# Patient Record
Sex: MALE | Race: WHITE | URBAN - METROPOLITAN AREA
[De-identification: names, ages, dates, MRNs, and addresses within clinical notes are randomized per-mention and may not be internally consistent; named-entity substitution may affect disease eponyms.]

---

## 2020-12-11 ENCOUNTER — OFFICE VISIT (OUTPATIENT)
Dept: FAMILY MEDICINE CLINIC | Facility: CLINIC | Age: 40
End: 2020-12-11
Payer: COMMERCIAL

## 2020-12-11 VITALS
WEIGHT: 277.25 LBS | HEIGHT: 68 IN | TEMPERATURE: 98 F | DIASTOLIC BLOOD PRESSURE: 82 MMHG | HEART RATE: 77 BPM | SYSTOLIC BLOOD PRESSURE: 128 MMHG | RESPIRATION RATE: 16 BRPM | BODY MASS INDEX: 42.02 KG/M2

## 2020-12-11 DIAGNOSIS — M10.9 GOUT OF BOTH KNEES: ICD-10-CM

## 2020-12-11 DIAGNOSIS — I10 ESSENTIAL HYPERTENSION: ICD-10-CM

## 2020-12-11 DIAGNOSIS — E66.01 CLASS 3 SEVERE OBESITY DUE TO EXCESS CALORIES WITHOUT SERIOUS COMORBIDITY WITH BODY MASS INDEX (BMI) OF 40.0 TO 44.9 IN ADULT (HCC): ICD-10-CM

## 2020-12-11 DIAGNOSIS — Z00.00 ROUTINE ADULT HEALTH MAINTENANCE: Primary | ICD-10-CM

## 2020-12-11 PROBLEM — E66.813 CLASS 3 SEVERE OBESITY DUE TO EXCESS CALORIES WITHOUT SERIOUS COMORBIDITY WITH BODY MASS INDEX (BMI) OF 40.0 TO 44.9 IN ADULT: Status: ACTIVE | Noted: 2020-12-11

## 2020-12-11 PROBLEM — E66.813 CLASS 3 SEVERE OBESITY DUE TO EXCESS CALORIES WITHOUT SERIOUS COMORBIDITY WITH BODY MASS INDEX (BMI) OF 40.0 TO 44.9 IN ADULT (HCC): Status: ACTIVE | Noted: 2020-12-11

## 2020-12-11 PROCEDURE — 3079F DIAST BP 80-89 MM HG: CPT | Performed by: FAMILY MEDICINE

## 2020-12-11 PROCEDURE — 99385 PREV VISIT NEW AGE 18-39: CPT | Performed by: FAMILY MEDICINE

## 2020-12-11 PROCEDURE — 3074F SYST BP LT 130 MM HG: CPT | Performed by: FAMILY MEDICINE

## 2020-12-11 PROCEDURE — 99202 OFFICE O/P NEW SF 15 MIN: CPT | Performed by: FAMILY MEDICINE

## 2020-12-11 PROCEDURE — 3008F BODY MASS INDEX DOCD: CPT | Performed by: FAMILY MEDICINE

## 2020-12-11 RX ORDER — ALLOPURINOL 300 MG/1
1 TABLET ORAL DAILY
COMMUNITY
Start: 2020-12-08 | End: 2022-01-31

## 2020-12-11 RX ORDER — AMLODIPINE BESYLATE 2.5 MG/1
1 TABLET ORAL DAILY
COMMUNITY
Start: 2020-12-08 | End: 2020-12-23

## 2020-12-11 RX ORDER — LISINOPRIL AND HYDROCHLOROTHIAZIDE 20; 12.5 MG/1; MG/1
1 TABLET ORAL DAILY
COMMUNITY
Start: 2015-02-01 | End: 2022-01-31

## 2020-12-11 RX ORDER — TRIAMTERENE AND HYDROCHLOROTHIAZIDE 37.5; 25 MG/1; MG/1
1 TABLET ORAL
COMMUNITY
Start: 2019-11-01 | End: 2020-12-23

## 2020-12-11 RX ORDER — ATORVASTATIN CALCIUM 20 MG/1
1 TABLET, FILM COATED ORAL DAILY
COMMUNITY
Start: 2020-12-08 | End: 2022-01-31

## 2020-12-11 NOTE — PROGRESS NOTES
Tawanda Eaton is a 45year old male who presents for a complete physical exam.   HPI:   Pt feels fine, no specific complaints today. Has h/o gout in both knees, controlled with allopurinol as preventive.      Allopurinol started at 100mg , the swelling  GI: denies abdominal pain,denies heartburn, denies n/v/c/d/change in stools/blood in stool/black stool/change in appetite  : denies nocturia or changes in urinary stream, denies scrotal mass/abnormal discharge from urethra  MUSCULOSKELETAL: den 09/28/2020      ASSESSMENT AND PLAN:   Tawanda Eaton is a 45year old male who presents for a complete physical exam.     Wai Boykin was seen today for physical and htn.     Diagnoses and all orders for this visit:    Routine adult health mainten

## 2020-12-21 ENCOUNTER — LAB ENCOUNTER (OUTPATIENT)
Dept: LAB | Age: 40
End: 2020-12-21
Attending: FAMILY MEDICINE
Payer: COMMERCIAL

## 2020-12-21 DIAGNOSIS — M10.9 GOUT OF BOTH KNEES: ICD-10-CM

## 2020-12-21 DIAGNOSIS — Z00.00 ROUTINE ADULT HEALTH MAINTENANCE: ICD-10-CM

## 2020-12-21 PROCEDURE — 84443 ASSAY THYROID STIM HORMONE: CPT

## 2020-12-21 PROCEDURE — 80061 LIPID PANEL: CPT

## 2020-12-21 PROCEDURE — 36415 COLL VENOUS BLD VENIPUNCTURE: CPT

## 2020-12-21 PROCEDURE — 85027 COMPLETE CBC AUTOMATED: CPT

## 2020-12-21 PROCEDURE — 84439 ASSAY OF FREE THYROXINE: CPT

## 2020-12-21 PROCEDURE — 84550 ASSAY OF BLOOD/URIC ACID: CPT

## 2020-12-21 PROCEDURE — 80053 COMPREHEN METABOLIC PANEL: CPT

## 2020-12-23 ENCOUNTER — OFFICE VISIT (OUTPATIENT)
Dept: FAMILY MEDICINE CLINIC | Facility: CLINIC | Age: 40
End: 2020-12-23
Payer: COMMERCIAL

## 2020-12-23 VITALS
SYSTOLIC BLOOD PRESSURE: 118 MMHG | RESPIRATION RATE: 16 BRPM | TEMPERATURE: 98 F | HEART RATE: 72 BPM | DIASTOLIC BLOOD PRESSURE: 68 MMHG | WEIGHT: 277.5 LBS | BODY MASS INDEX: 42.06 KG/M2 | HEIGHT: 68 IN

## 2020-12-23 DIAGNOSIS — R74.8 ELEVATED LIVER ENZYMES: ICD-10-CM

## 2020-12-23 DIAGNOSIS — E66.01 CLASS 3 SEVERE OBESITY DUE TO EXCESS CALORIES WITHOUT SERIOUS COMORBIDITY WITH BODY MASS INDEX (BMI) OF 40.0 TO 44.9 IN ADULT (HCC): ICD-10-CM

## 2020-12-23 DIAGNOSIS — E78.00 HIGH CHOLESTEROL: Primary | ICD-10-CM

## 2020-12-23 PROCEDURE — 3008F BODY MASS INDEX DOCD: CPT | Performed by: FAMILY MEDICINE

## 2020-12-23 PROCEDURE — 3078F DIAST BP <80 MM HG: CPT | Performed by: FAMILY MEDICINE

## 2020-12-23 PROCEDURE — 3074F SYST BP LT 130 MM HG: CPT | Performed by: FAMILY MEDICINE

## 2020-12-23 PROCEDURE — 99214 OFFICE O/P EST MOD 30 MIN: CPT | Performed by: FAMILY MEDICINE

## 2020-12-24 PROBLEM — R74.8 ELEVATED LIVER ENZYMES: Status: ACTIVE | Noted: 2020-12-24

## 2020-12-24 PROBLEM — E78.00 HIGH CHOLESTEROL: Status: ACTIVE | Noted: 2020-12-24

## 2020-12-24 NOTE — PROGRESS NOTES
HPI:   Rosa Casillas is a 45year old male that presents for follow-up of lab work from physical.    Past medical, surgical, family and social history reviewed in detail with patient. Updated where appropriate.       Patient Active Problem List rales/rhonchi/wheezing. ABDOMEN:  Soft, nondistended, nontender, bowel sounds normal in all 4 quadrants, no masses, no hepatosplenomegaly. EXTREMITIES:  No edema, no cyanosis, 2+ radial pulses b/l.    NEURO:  Grossly normal     ASSESSMENT AND PLAN:      1

## 2022-01-31 ENCOUNTER — OFFICE VISIT (OUTPATIENT)
Dept: FAMILY MEDICINE CLINIC | Facility: CLINIC | Age: 42
End: 2022-01-31
Payer: COMMERCIAL

## 2022-01-31 ENCOUNTER — LAB ENCOUNTER (OUTPATIENT)
Dept: LAB | Age: 42
End: 2022-01-31
Attending: FAMILY MEDICINE
Payer: COMMERCIAL

## 2022-01-31 VITALS
WEIGHT: 276.5 LBS | BODY MASS INDEX: 41.91 KG/M2 | OXYGEN SATURATION: 98 % | RESPIRATION RATE: 16 BRPM | SYSTOLIC BLOOD PRESSURE: 120 MMHG | HEART RATE: 75 BPM | DIASTOLIC BLOOD PRESSURE: 88 MMHG | TEMPERATURE: 97 F | HEIGHT: 68 IN

## 2022-01-31 DIAGNOSIS — Z00.00 ROUTINE ADULT HEALTH MAINTENANCE: Primary | ICD-10-CM

## 2022-01-31 DIAGNOSIS — E66.01 CLASS 3 SEVERE OBESITY DUE TO EXCESS CALORIES WITHOUT SERIOUS COMORBIDITY WITH BODY MASS INDEX (BMI) OF 40.0 TO 44.9 IN ADULT (HCC): ICD-10-CM

## 2022-01-31 DIAGNOSIS — M10.9 GOUT OF BOTH KNEES: ICD-10-CM

## 2022-01-31 DIAGNOSIS — Z00.00 ROUTINE ADULT HEALTH MAINTENANCE: ICD-10-CM

## 2022-01-31 DIAGNOSIS — E78.00 HIGH CHOLESTEROL: ICD-10-CM

## 2022-01-31 LAB
ALBUMIN SERPL-MCNC: 4.2 G/DL (ref 3.4–5)
ALBUMIN/GLOB SERPL: 1.5 {RATIO} (ref 1–2)
ALP LIVER SERPL-CCNC: 85 U/L
ALT SERPL-CCNC: 116 U/L
ANION GAP SERPL CALC-SCNC: 6 MMOL/L (ref 0–18)
AST SERPL-CCNC: 54 U/L (ref 15–37)
BASOPHILS # BLD AUTO: 0.06 X10(3) UL (ref 0–0.2)
BASOPHILS NFR BLD AUTO: 0.9 %
BILIRUB SERPL-MCNC: 0.6 MG/DL (ref 0.1–2)
BUN BLD-MCNC: 13 MG/DL (ref 7–18)
CALCIUM BLD-MCNC: 9.5 MG/DL (ref 8.5–10.1)
CHLORIDE SERPL-SCNC: 107 MMOL/L (ref 98–112)
CHOLEST SERPL-MCNC: 174 MG/DL (ref ?–200)
CO2 SERPL-SCNC: 28 MMOL/L (ref 21–32)
CREAT BLD-MCNC: 0.97 MG/DL
EOSINOPHIL # BLD AUTO: 0.12 X10(3) UL (ref 0–0.7)
EOSINOPHIL NFR BLD AUTO: 1.8 %
ERYTHROCYTE [DISTWIDTH] IN BLOOD BY AUTOMATED COUNT: 13.4 %
FASTING PATIENT LIPID ANSWER: YES
FASTING STATUS PATIENT QL REPORTED: YES
GLOBULIN PLAS-MCNC: 2.8 G/DL (ref 2.8–4.4)
GLUCOSE BLD-MCNC: 85 MG/DL (ref 70–99)
HDLC SERPL-MCNC: 36 MG/DL (ref 40–59)
HGB BLD-MCNC: 15.6 G/DL
IMM GRANULOCYTES # BLD AUTO: 0.01 X10(3) UL (ref 0–1)
IMM GRANULOCYTES NFR BLD: 0.1 %
LDLC SERPL CALC-MCNC: 108 MG/DL (ref ?–100)
LYMPHOCYTES # BLD AUTO: 3.54 X10(3) UL (ref 1–4)
LYMPHOCYTES NFR BLD AUTO: 51.9 %
MCH RBC QN AUTO: 30.4 PG (ref 26–34)
MCHC RBC AUTO-ENTMCNC: 33.1 G/DL (ref 31–37)
MCV RBC AUTO: 91.6 FL
MONOCYTES # BLD AUTO: 0.54 X10(3) UL (ref 0.1–1)
MONOCYTES NFR BLD AUTO: 7.9 %
NEUTROPHILS # BLD AUTO: 2.55 X10 (3) UL (ref 1.5–7.7)
NEUTROPHILS # BLD AUTO: 2.55 X10(3) UL (ref 1.5–7.7)
NEUTROPHILS NFR BLD AUTO: 37.4 %
NONHDLC SERPL-MCNC: 138 MG/DL (ref ?–130)
OSMOLALITY SERPL CALC.SUM OF ELEC: 291 MOSM/KG (ref 275–295)
PLATELET # BLD AUTO: 235 10(3)UL (ref 150–450)
POTASSIUM SERPL-SCNC: 4 MMOL/L (ref 3.5–5.1)
PROT SERPL-MCNC: 7 G/DL (ref 6.4–8.2)
RBC # BLD AUTO: 5.14 X10(6)UL
SODIUM SERPL-SCNC: 141 MMOL/L (ref 136–145)
T4 FREE SERPL-MCNC: 1 NG/DL (ref 0.8–1.7)
TRIGL SERPL-MCNC: 173 MG/DL (ref 30–149)
TSI SER-ACNC: 1.58 MIU/ML (ref 0.36–3.74)
WBC # BLD AUTO: 6.8 X10(3) UL (ref 4–11)

## 2022-01-31 PROCEDURE — 3074F SYST BP LT 130 MM HG: CPT | Performed by: FAMILY MEDICINE

## 2022-01-31 PROCEDURE — 36415 COLL VENOUS BLD VENIPUNCTURE: CPT

## 2022-01-31 PROCEDURE — 84550 ASSAY OF BLOOD/URIC ACID: CPT

## 2022-01-31 PROCEDURE — 80061 LIPID PANEL: CPT

## 2022-01-31 PROCEDURE — 3008F BODY MASS INDEX DOCD: CPT | Performed by: FAMILY MEDICINE

## 2022-01-31 PROCEDURE — 84443 ASSAY THYROID STIM HORMONE: CPT

## 2022-01-31 PROCEDURE — 99396 PREV VISIT EST AGE 40-64: CPT | Performed by: FAMILY MEDICINE

## 2022-01-31 PROCEDURE — 80053 COMPREHEN METABOLIC PANEL: CPT

## 2022-01-31 PROCEDURE — 85025 COMPLETE CBC W/AUTO DIFF WBC: CPT

## 2022-01-31 PROCEDURE — 84439 ASSAY OF FREE THYROXINE: CPT

## 2022-01-31 PROCEDURE — 3079F DIAST BP 80-89 MM HG: CPT | Performed by: FAMILY MEDICINE

## 2022-01-31 RX ORDER — ATORVASTATIN CALCIUM 20 MG/1
20 TABLET, FILM COATED ORAL DAILY
Qty: 90 TABLET | Refills: 1 | Status: SHIPPED | OUTPATIENT
Start: 2022-01-31

## 2022-01-31 RX ORDER — LISINOPRIL AND HYDROCHLOROTHIAZIDE 20; 12.5 MG/1; MG/1
1 TABLET ORAL DAILY
Qty: 90 TABLET | Refills: 1 | Status: SHIPPED | OUTPATIENT
Start: 2022-01-31

## 2022-01-31 RX ORDER — ALLOPURINOL 300 MG/1
300 TABLET ORAL DAILY
Qty: 90 TABLET | Refills: 1 | Status: SHIPPED | OUTPATIENT
Start: 2022-01-31

## 2022-01-31 NOTE — PROGRESS NOTES
Anali Patel is a 37year old male who presents for a complete physical exam.   HPI:   Pt complains of nothing.   Urination changes no  ED symptoms no  Immunizations needed no  Wt Readings from Last 6 Encounters:  01/31/22 : 276 lb 8 oz (125.4 kg - 15.0 %    RDW-SD 45.6 35.1 - 46.3 fL       Current Outpatient Medications   Medication Sig Dispense Refill   • atorvastatin 20 MG Oral Tab Take 1 tablet (20 mg total) by mouth daily.  90 tablet 1   • lisinopril-hydroCHLOROthiazide 20-12.5 MG Oral Tab Take kg)   SpO2 98%   BMI 42.04 kg/m²   Body mass index is 42.04 kg/m².    GENERAL: NAD, pleasant, well developed, normal voice  SKIN: no rashes, no suspicious moles or lesions  HENT: NCAT, EACs clear b/l, TMs normal b/l, nasal turbinates normal b/l, oropharynx Future    Other orders  -     atorvastatin 20 MG Oral Tab; Take 1 tablet (20 mg total) by mouth daily. -     lisinopril-hydroCHLOROthiazide 20-12.5 MG Oral Tab; Take 1 tablet by mouth daily. -     allopurinol 300 MG Oral Tab;  Take 1 tablet (300 mg total)

## 2022-06-28 RX ORDER — ALLOPURINOL 300 MG/1
TABLET ORAL
Qty: 90 TABLET | Refills: 0 | OUTPATIENT
Start: 2022-06-28

## 2022-06-28 RX ORDER — ATORVASTATIN CALCIUM 20 MG/1
TABLET, FILM COATED ORAL
Qty: 90 TABLET | Refills: 0 | OUTPATIENT
Start: 2022-06-28

## 2022-06-28 RX ORDER — LISINOPRIL AND HYDROCHLOROTHIAZIDE 20; 12.5 MG/1; MG/1
TABLET ORAL
Qty: 90 TABLET | Refills: 0 | OUTPATIENT
Start: 2022-06-28

## 2022-06-28 NOTE — TELEPHONE ENCOUNTER
Future Appointments   Date Time Provider Deepthi Aguila   7/25/2022  9:40 AM Fabiola Malone MD [facility] [address] [facility] [address]     Pt has enough medication to last until his appt.

## 2022-07-25 ENCOUNTER — OFFICE VISIT (OUTPATIENT)
Dept: FAMILY MEDICINE CLINIC | Facility: CLINIC | Age: 42
End: 2022-07-25
Payer: COMMERCIAL

## 2022-07-25 VITALS
WEIGHT: 283.25 LBS | SYSTOLIC BLOOD PRESSURE: 112 MMHG | RESPIRATION RATE: 16 BRPM | TEMPERATURE: 97 F | OXYGEN SATURATION: 98 % | HEART RATE: 77 BPM | DIASTOLIC BLOOD PRESSURE: 80 MMHG | HEIGHT: 68 IN | BODY MASS INDEX: 42.93 KG/M2

## 2022-07-25 DIAGNOSIS — M10.9 GOUT OF BOTH KNEES: ICD-10-CM

## 2022-07-25 DIAGNOSIS — R74.8 ELEVATED LIVER ENZYMES: ICD-10-CM

## 2022-07-25 DIAGNOSIS — I10 ESSENTIAL HYPERTENSION: Primary | ICD-10-CM

## 2022-07-25 DIAGNOSIS — G47.33 OSA (OBSTRUCTIVE SLEEP APNEA): ICD-10-CM

## 2022-07-25 DIAGNOSIS — E66.01 CLASS 3 SEVERE OBESITY DUE TO EXCESS CALORIES WITHOUT SERIOUS COMORBIDITY WITH BODY MASS INDEX (BMI) OF 40.0 TO 44.9 IN ADULT (HCC): ICD-10-CM

## 2022-07-25 DIAGNOSIS — E78.00 HIGH CHOLESTEROL: ICD-10-CM

## 2022-07-25 PROCEDURE — 3074F SYST BP LT 130 MM HG: CPT | Performed by: FAMILY MEDICINE

## 2022-07-25 PROCEDURE — 3008F BODY MASS INDEX DOCD: CPT | Performed by: FAMILY MEDICINE

## 2022-07-25 PROCEDURE — 3079F DIAST BP 80-89 MM HG: CPT | Performed by: FAMILY MEDICINE

## 2022-07-25 PROCEDURE — 99215 OFFICE O/P EST HI 40 MIN: CPT | Performed by: FAMILY MEDICINE

## 2022-07-25 RX ORDER — LISINOPRIL AND HYDROCHLOROTHIAZIDE 20; 12.5 MG/1; MG/1
1 TABLET ORAL DAILY
Qty: 90 TABLET | Refills: 1 | Status: SHIPPED | OUTPATIENT
Start: 2022-07-25

## 2022-07-25 RX ORDER — ALLOPURINOL 300 MG/1
300 TABLET ORAL DAILY
Qty: 90 TABLET | Refills: 1 | Status: SHIPPED | OUTPATIENT
Start: 2022-07-25

## 2022-07-25 RX ORDER — ATORVASTATIN CALCIUM 20 MG/1
20 TABLET, FILM COATED ORAL DAILY
Qty: 90 TABLET | Refills: 1 | Status: SHIPPED | OUTPATIENT
Start: 2022-07-25

## 2022-08-01 ENCOUNTER — LAB ENCOUNTER (OUTPATIENT)
Dept: LAB | Age: 42
End: 2022-08-01
Attending: FAMILY MEDICINE
Payer: COMMERCIAL

## 2022-08-01 DIAGNOSIS — E78.00 HIGH CHOLESTEROL: ICD-10-CM

## 2022-08-01 DIAGNOSIS — R74.8 ELEVATED LIVER ENZYMES: ICD-10-CM

## 2022-08-01 DIAGNOSIS — M10.9 GOUT OF BOTH KNEES: ICD-10-CM

## 2022-08-01 LAB
ALBUMIN SERPL-MCNC: 4.1 G/DL (ref 3.4–5)
ALBUMIN/GLOB SERPL: 1.2 {RATIO} (ref 1–2)
ALP LIVER SERPL-CCNC: 81 U/L
ALT SERPL-CCNC: 117 U/L
ANION GAP SERPL CALC-SCNC: 4 MMOL/L (ref 0–18)
AST SERPL-CCNC: 60 U/L (ref 15–37)
BILIRUB SERPL-MCNC: 0.5 MG/DL (ref 0.1–2)
BUN BLD-MCNC: 13 MG/DL (ref 7–18)
CALCIUM BLD-MCNC: 9.7 MG/DL (ref 8.5–10.1)
CHLORIDE SERPL-SCNC: 105 MMOL/L (ref 98–112)
CHOLEST SERPL-MCNC: 171 MG/DL (ref ?–200)
CO2 SERPL-SCNC: 28 MMOL/L (ref 21–32)
CREAT BLD-MCNC: 1.1 MG/DL
FASTING PATIENT LIPID ANSWER: YES
FASTING STATUS PATIENT QL REPORTED: YES
GLOBULIN PLAS-MCNC: 3.5 G/DL (ref 2.8–4.4)
GLUCOSE BLD-MCNC: 135 MG/DL (ref 70–99)
HDLC SERPL-MCNC: 36 MG/DL (ref 40–59)
LDLC SERPL CALC-MCNC: 98 MG/DL (ref ?–100)
NONHDLC SERPL-MCNC: 135 MG/DL (ref ?–130)
OSMOLALITY SERPL CALC.SUM OF ELEC: 286 MOSM/KG (ref 275–295)
POTASSIUM SERPL-SCNC: 3.9 MMOL/L (ref 3.5–5.1)
PROT SERPL-MCNC: 7.6 G/DL (ref 6.4–8.2)
SODIUM SERPL-SCNC: 137 MMOL/L (ref 136–145)
TRIGL SERPL-MCNC: 216 MG/DL (ref 30–149)
URATE SERPL-MCNC: 4.2 MG/DL
VLDLC SERPL CALC-MCNC: 36 MG/DL (ref 0–30)

## 2022-08-01 PROCEDURE — 36415 COLL VENOUS BLD VENIPUNCTURE: CPT

## 2022-08-01 PROCEDURE — 80061 LIPID PANEL: CPT

## 2022-08-01 PROCEDURE — 80053 COMPREHEN METABOLIC PANEL: CPT

## 2022-08-01 PROCEDURE — 84550 ASSAY OF BLOOD/URIC ACID: CPT

## 2022-08-02 DIAGNOSIS — R74.8 ELEVATED LIVER ENZYMES: Primary | ICD-10-CM

## 2022-08-03 DIAGNOSIS — R74.8 ELEVATED LIVER ENZYMES: Primary | ICD-10-CM

## 2022-09-10 ENCOUNTER — HOSPITAL ENCOUNTER (OUTPATIENT)
Dept: ULTRASOUND IMAGING | Age: 42
Discharge: HOME | End: 2022-09-10
Attending: FAMILY MEDICINE
Payer: COMMERCIAL

## 2022-09-10 DIAGNOSIS — R74.8 ELEVATED LIVER ENZYMES: ICD-10-CM

## 2022-09-10 PROCEDURE — 76700 US EXAM ABDOM COMPLETE: CPT | Performed by: FAMILY MEDICINE

## 2022-09-21 DIAGNOSIS — R16.1 SPLENOMEGALY: Primary | ICD-10-CM

## 2022-10-10 ENCOUNTER — OFFICE VISIT (OUTPATIENT)
Dept: INTERNAL MEDICINE CLINIC | Facility: CLINIC | Age: 42
End: 2022-10-10
Payer: COMMERCIAL

## 2022-10-10 VITALS
BODY MASS INDEX: 44.05 KG/M2 | WEIGHT: 284 LBS | DIASTOLIC BLOOD PRESSURE: 88 MMHG | RESPIRATION RATE: 16 BRPM | SYSTOLIC BLOOD PRESSURE: 154 MMHG | HEART RATE: 90 BPM | HEIGHT: 67.5 IN

## 2022-10-10 DIAGNOSIS — I10 ESSENTIAL HYPERTENSION: ICD-10-CM

## 2022-10-10 DIAGNOSIS — Z83.3 FAMILY HISTORY OF DIABETES MELLITUS IN GRANDMOTHER: ICD-10-CM

## 2022-10-10 DIAGNOSIS — R74.8 ELEVATED LIVER ENZYMES: ICD-10-CM

## 2022-10-10 DIAGNOSIS — E66.01 SEVERE OBESITY (BMI >= 40) (HCC): ICD-10-CM

## 2022-10-10 DIAGNOSIS — Z51.81 THERAPEUTIC DRUG MONITORING: Primary | ICD-10-CM

## 2022-10-10 DIAGNOSIS — E78.00 HIGH CHOLESTEROL: ICD-10-CM

## 2022-10-10 PROCEDURE — 3008F BODY MASS INDEX DOCD: CPT | Performed by: NURSE PRACTITIONER

## 2022-10-10 PROCEDURE — 3079F DIAST BP 80-89 MM HG: CPT | Performed by: NURSE PRACTITIONER

## 2022-10-10 PROCEDURE — 99204 OFFICE O/P NEW MOD 45 MIN: CPT | Performed by: NURSE PRACTITIONER

## 2022-10-10 PROCEDURE — 3077F SYST BP >= 140 MM HG: CPT | Performed by: NURSE PRACTITIONER

## 2022-10-10 NOTE — PATIENT INSTRUCTIONS
We are here to support you with weight loss, but please remember that you still need your primary care provider for your routine health maintenance. PLAN:  Get blood work done  [employer] out some pre-made meal options: sandy flynn MD, metabolic meals, Factor 75, Freshly  Check with insurance on coverage for GLP-1 medications:  (ie. saxenda- daily, wegovy- weekly) or for the diagnosis of prediabetes or diabetes- (ie. Ozempic- weekly, trulicity- weekly, rybelsus- oral/ daily)    Follow up with me in 5 weeks  Schedule follow up appointments: Shelby Alejandre (dietitian) or Daryl Kay (presurgery dietitian)   Check for insurance coverage for dietitian and labwork prior to scheduling appointment. Please try to work on the following dietary changes:  1. Goals: Aim for 20-30 grams of protein/ meal  i. Aim for 170 grams of carbohydrates/day  ii. Eat 4-6 vegetables/day  iii. Avoid skipping meals- eat every 4-5 hours  iv. Aim for 3 meals/day  2. Drink lots of water and cut down on soda/juice consumption if soda/juice drinker  3. Focus on protein: (15-30 grams with each meal) ie. greek yogurt, cottage cheese, string cheese, hard boiled eggs  4. Healthy snacks: peanut butter and apples, hummus and carrots, berries, nuts (1/4 cup), tuna and crackers                 Protein Shakes: Premier protein or Core Power                Protein Bars: Rx Bars, Oatmega, Power Crunch                 Sargento balanced breaks (cheese and nuts)- without chocolate  5. Reduce carbohydrates which includes sweets as well as rice, pasta, potatoes, bread, corn and instead choose whole grain options or more protein or vegetables (4-6 servings of vegetables per day)  6. Get a good night of sleep  7. Try to decrease stress in life     Please download apps:  1.  \"My Fitness Pal\" (other option is Lose it)) to help you to monitor daily dietary intake and you will be able to see if you are eating the right amount of calories, protein, carbs                With My Fitness Pal-->When you set-up the luna or need to adjust settings:                Goals should include: Lose 1.5-2 lbs per week                Activity level: not very active (can't count exercise towards calorie number per day)                   ** Daily INPUT> Look at nutrition section-- \"nutrients\" and it will break down your macros for the day (ie. Protein, carbs, fibers, sugars and fats). Try to stay within these numbers daily     2. \"7 minute workout\" to help with exercise/activity which takes 7 minutes of your day and that you can do at home! 3. \"Calm\" or \"Headspace\" which helps with mindfulness, meditation, clarity, sleep, and arsh to your daily life. 4. [employer] blog for healthy recipe ideas  5. [employer] for low carb resources    HIGH PROTEIN SNACK IDEAS  -cottage cheese  -plain yogurt  -kefir  -hard-boiled eggs  -natural cheeses  -nuts (measure portion size)   -unsweetened nut butters  -dried edamame   -marisol seeds soaked in water or almond milk  -soy nuts  -cured meats (monitor for sodium issues)   -hummus with vegetables  -bean dip with vegetables     FRUIT  Low carb fruit options   Raspberries: Half a cup (60 grams) contains 3 grams of carbs. Blackberries: Half a cup (70 grams) contains 4 grams of carbs. Strawberries: Half a cup (100 grams) contains 6 grams of carbs. Blueberries: Half a cup (50 grams) contains 6 grams of carbs. Plum: One medium-sized (80 grams) contains 6 grams of carbs.      VEGETABLES  Low carb vegetables

## 2022-10-24 ENCOUNTER — LAB ENCOUNTER (OUTPATIENT)
Dept: LAB | Age: 42
End: 2022-10-24
Attending: NURSE PRACTITIONER
Payer: COMMERCIAL

## 2022-10-24 DIAGNOSIS — R79.89 ELEVATED LFTS: ICD-10-CM

## 2022-10-24 DIAGNOSIS — Z83.3 FAMILY HISTORY OF DIABETES MELLITUS IN GRANDMOTHER: ICD-10-CM

## 2022-10-24 DIAGNOSIS — E66.01 SEVERE OBESITY (BMI >= 40) (HCC): ICD-10-CM

## 2022-10-24 DIAGNOSIS — Z51.81 THERAPEUTIC DRUG MONITORING: ICD-10-CM

## 2022-10-24 LAB
CERULOPLASMIN SERPL-MCNC: 27.3 MG/DL (ref 20–60)
DEPRECATED HBV CORE AB SER IA-ACNC: 319.9 NG/ML
EST. AVERAGE GLUCOSE BLD GHB EST-MCNC: 137 MG/DL (ref 68–126)
HAV AB SER QL IA: NONREACTIVE
HBA1C MFR BLD: 6.4 % (ref ?–5.7)
HBV SURFACE AB SER QL: REACTIVE
HBV SURFACE AB SERPL IA-ACNC: 237.14 MIU/ML
HBV SURFACE AG SER-ACNC: <0.1 [IU]/L
HBV SURFACE AG SERPL QL IA: NONREACTIVE
HCV AB SERPL QL IA: NONREACTIVE
IGA SERPL-MCNC: 242 MG/DL (ref 70–312)
IGM SERPL-MCNC: 88.5 MG/DL (ref 43–279)
IMMUNOGLOBULIN PNL SER-MCNC: 540 MG/DL (ref 791–1643)
IRON SATN MFR SERPL: 22 %
IRON SERPL-MCNC: 75 UG/DL
TIBC SERPL-MCNC: 346 UG/DL (ref 240–450)
TRANSFERRIN SERPL-MCNC: 232 MG/DL (ref 200–360)
VIT B12 SERPL-MCNC: 402 PG/ML (ref 193–986)
VIT D+METAB SERPL-MCNC: 20.2 NG/ML (ref 30–100)

## 2022-10-24 PROCEDURE — 82390 ASSAY OF CERULOPLASMIN: CPT

## 2022-10-24 PROCEDURE — 82306 VITAMIN D 25 HYDROXY: CPT

## 2022-10-24 PROCEDURE — 86706 HEP B SURFACE ANTIBODY: CPT

## 2022-10-24 PROCEDURE — 82104 ALPHA-1-ANTITRYPSIN PHENO: CPT

## 2022-10-24 PROCEDURE — 83516 IMMUNOASSAY NONANTIBODY: CPT

## 2022-10-24 PROCEDURE — 87340 HEPATITIS B SURFACE AG IA: CPT

## 2022-10-24 PROCEDURE — 86708 HEPATITIS A ANTIBODY: CPT

## 2022-10-24 PROCEDURE — 82784 ASSAY IGA/IGD/IGG/IGM EACH: CPT

## 2022-10-24 PROCEDURE — 86364 TISS TRNSGLTMNASE EA IG CLAS: CPT

## 2022-10-24 PROCEDURE — 82728 ASSAY OF FERRITIN: CPT

## 2022-10-24 PROCEDURE — 83036 HEMOGLOBIN GLYCOSYLATED A1C: CPT

## 2022-10-24 PROCEDURE — 82103 ALPHA-1-ANTITRYPSIN TOTAL: CPT

## 2022-10-24 PROCEDURE — 83540 ASSAY OF IRON: CPT

## 2022-10-24 PROCEDURE — 83550 IRON BINDING TEST: CPT

## 2022-10-24 PROCEDURE — 86803 HEPATITIS C AB TEST: CPT

## 2022-10-24 PROCEDURE — 82607 VITAMIN B-12: CPT

## 2022-10-24 PROCEDURE — 36415 COLL VENOUS BLD VENIPUNCTURE: CPT

## 2022-10-25 ENCOUNTER — PATIENT MESSAGE (OUTPATIENT)
Dept: INTERNAL MEDICINE CLINIC | Facility: CLINIC | Age: 42
End: 2022-10-25

## 2022-10-25 DIAGNOSIS — E55.9 VITAMIN D DEFICIENCY: Primary | ICD-10-CM

## 2022-10-25 LAB — TTG IGA SER-ACNC: 0.5 U/ML (ref ?–7)

## 2022-10-25 RX ORDER — ERGOCALCIFEROL 1.25 MG/1
50000 CAPSULE ORAL WEEKLY
Qty: 16 CAPSULE | Refills: 0 | Status: SHIPPED | OUTPATIENT
Start: 2022-10-25

## 2022-10-25 NOTE — PROGRESS NOTES
Elevated blood sugar (a1c) it is in the prediabetes/ diabetic range: 6.4%- I recommend avoiding carbs, exercise, and possibly starting a medication, which will help with weight loss, prediabetes/diabetes   Low Vitamin d: please start taking ergocalciferol 50,000 U q week x16 weeks (please order).  Then start daily maintenance vitamin D 2000 Units  Mildly low Vitamin B12: please start daily over the counter B complex vitamin

## 2022-10-25 NOTE — TELEPHONE ENCOUNTER
From: Saeid Clarke  To: ROCK Ricardo  Sent: 10/25/2022 1:42 PM CDT  Subject: Question regarding VITAMIN B12    Hello, thanks for the results readout. I will get the vitamin D today. Is the B12 dosage OTC or prescribed? Should we plan on the weight loss med discussion at my follow up in late Nov, or start sooner?

## 2022-10-26 LAB
F-ACTIN (SMOOTH MUSCLE) AB: 2 UNITS
MITOCHONDRIAL M2 AB, IGG: 8.6 UNITS

## 2022-10-26 RX ORDER — SEMAGLUTIDE 1.34 MG/ML
0.25 INJECTION, SOLUTION SUBCUTANEOUS WEEKLY
Qty: 1.5 ML | Refills: 1 | Status: SHIPPED | OUTPATIENT
Start: 2022-10-26

## 2022-10-28 LAB — ALPHA-1-ANTITRYPSIN: 131 MG/DL

## 2022-11-28 ENCOUNTER — OFFICE VISIT (OUTPATIENT)
Dept: INTERNAL MEDICINE CLINIC | Facility: CLINIC | Age: 42
End: 2022-11-28
Payer: COMMERCIAL

## 2022-11-28 VITALS
WEIGHT: 268 LBS | HEIGHT: 67.5 IN | DIASTOLIC BLOOD PRESSURE: 80 MMHG | SYSTOLIC BLOOD PRESSURE: 122 MMHG | HEART RATE: 78 BPM | RESPIRATION RATE: 16 BRPM | BODY MASS INDEX: 41.57 KG/M2

## 2022-11-28 DIAGNOSIS — E66.01 SEVERE OBESITY (BMI >= 40) (HCC): ICD-10-CM

## 2022-11-28 DIAGNOSIS — I10 ESSENTIAL HYPERTENSION: ICD-10-CM

## 2022-11-28 DIAGNOSIS — R74.8 ELEVATED LIVER ENZYMES: ICD-10-CM

## 2022-11-28 DIAGNOSIS — R73.03 PREDIABETES: ICD-10-CM

## 2022-11-28 DIAGNOSIS — E78.00 HIGH CHOLESTEROL: ICD-10-CM

## 2022-11-28 DIAGNOSIS — Z51.81 THERAPEUTIC DRUG MONITORING: Primary | ICD-10-CM

## 2022-11-28 PROCEDURE — 99213 OFFICE O/P EST LOW 20 MIN: CPT | Performed by: NURSE PRACTITIONER

## 2022-11-28 PROCEDURE — 3008F BODY MASS INDEX DOCD: CPT | Performed by: NURSE PRACTITIONER

## 2022-11-28 PROCEDURE — 3079F DIAST BP 80-89 MM HG: CPT | Performed by: NURSE PRACTITIONER

## 2022-11-28 PROCEDURE — 3074F SYST BP LT 130 MM HG: CPT | Performed by: NURSE PRACTITIONER

## 2022-11-28 RX ORDER — SEMAGLUTIDE 1.34 MG/ML
0.25 INJECTION, SOLUTION SUBCUTANEOUS WEEKLY
Qty: 1.5 ML | Refills: 1 | Status: CANCELLED | OUTPATIENT
Start: 2022-11-28

## 2022-11-28 NOTE — PATIENT INSTRUCTIONS
Next steps:  1. Fill your prescribed medication and take as discussed and prescribed: ozempic  2. Schedule a personal nutrition consultation with one of our registered dieticians     Please try to work on the following dietary changes:    1. Drink water with meals and throughout the day, cut down on soda and/or juice if consumed. Consider flavored water options like Bubbly, Spindrift, Hint and Brenda. 2.  Eat breakfast daily and focus on having protein with each meal, examples include: greek yogurt, cottage cheese, hard boiled egg, whole grain toast with peanut butter. 3.  Reduce refined carbohydrates and sugars which includes items such as sweets, as well as rice, pasta, and bread and make sure to choose whole grain options when having them with just 1 serving per meal about the size of your inner palm. 4.  Consume non starchy veggies daily working towards making them a good 50% of your daily food intake. Add them to lunch and dinner consistently. 5.  Start a daily probiotic: VSL#3 is recommended, (order on line at www.vsl3. com). Take 1 capsule daily with water for 30 days, then reduce to 1 every other day (this will reduce the cost). Capsules can be left out for 2 weeks, but then must be refrigerated. Please download luna My Fitness Torrie Scot! Or Net Diary to monitor daily dietary intake and you will be able to see if you are eating the right amount of calories or too much or too little which would hinder weight loss. Additionally this will help to see your daily carbohydrate and protein intake. When you set the luna up choose 1-2 lbs/week as a goal.  Keeping a paper food journal is an option as well to remain accountable for your choices- this is the start to mindful eating! A low calorie diet has been consistently shown to support weight loss. Continue or start exercising to help establish a routine.  If not already exercising begin with 1 day and progress as able with long-term goal of 30 minutes 5 days a week at a minimum. Meditation daily can help manage and control stress. Chronic stress can make weight loss difficult. Exercising is one way to help with stress, but meditation using the CALM Héctor or another comparable alternative can be done in your home or place of work with little time commitment. This Héctor can also help work on behavior change and improve sleep. Check out the segment under Calm Masterclass and listen to The 4 Pillars of Health. A great way to begin learning about the foundation of lifestyle with practical tips to use in your every day. Check out www.yourweightmatters. org blog for continued daily support and education along this weight loss journey! Patient Resources:     Personal Training/Fitness Classes/Health Coaching     Eric Felipe and Lira Pamvasile @ [email or web address]/ Full fitness center with group fitness and personal training. Discount available as client of [facility]. Health Coaching and Personal Training with Myrtle Barry at our [facility]- individual weekly coaching with option to add personal training and small group fitness classes targeted at weight loss- [phone] and/or email @ Reji Harrison. [email or web address]  360FIT [place] [email or web address] Group Fitness [phone] and/or email Darron Foster at [email or web address]  [address] [facility] with multiple locations: [employer]et[employer] (www.ContinuityX Solutions), Eat The [employer] Fitness (www.Core Essence Orthopaedics. Datameer), Fit Body Bootcamp (www.Hotel Booking Solutions IncorporatedbodyboLinkedwithp.Datameer), [place] (www.Universal Fuels), The Exercise  (www.exercisecoach.Datameer)     Online Fitness  Fitness  on Whole Foods in 10 DVD series- www. oecyr83AGI. Datameer  Sit and Be Fit - Chair exercise series Www.sitandbefit. org  Hip Hop Fit with Ashwin Lawrence at www.hiphopfit. net     Apps for on the Netac 7 Minute Workout (orange box with white 7) - free on the go HIIT training héctor  Peloton Héctor @ www. Mainstay Medical     Nutrition Trackers and Tools  LoseIT! And My Fitness Pal apps and on line for tracking nutrition  NOOM - virtual health coaching  FitFoundation (healthy meals on the go) in [facility] @ www. xrlkvgxeovhdr2d. Trixie Green MD @ www.LendingRobottromd.com and Etheldliliane Block (keto and low carb plans recommended) @ www. RTZQNT31.NFR, Metabolic Meals @ www. MyMetabolicMeals. com - individual prepared meals to go  NeurOptics, NemeriX, [employer], Every Plate, [employer]- on line meal delivery programs for preparation at home  [place] [employer] in [place] for homemade meals to go @ wwwTaltopia. Capical  Diet Doctor @ www. dietdoctor. Capical - low carb swaps  [employer] - meal prep and planning héctor (www.yummly. com)     Stress Management/Behavior/Mindful Eating  CALM meditation héctor (www.L99.com)  Headspace  Am I Hungry? Mindful eating virtual  héctor  Www.yourweightmatters. org - Obesity Action Coalition sponsored Blog posts daily  Motivation héctor (black box with white \")- daily supportive messages sent to your phone     Books/Video Education/Podcasts  Mindless Eating by Dominic Harrison  Why We Get Sick by Ramila Carrera (a book about insulin resistance)  Atomic Habits by Dave Hendrix (a book about taking small steps to promote greater behavior change)   Can't Hurt Me by Nicole Guajardo (a book exploring the power of discipline in achieving your goals)  The End of Dieting: How to Live for Life by Dr. Dalila Ryan M.D. or listen to The [facility] Episode 61: Understanding \"Nutritarian\" Eating w/Dr. Dalila Ryan  Your Body in Balance: The World [employer] Corporation of Food, Hormones, and Health by Dr. Scooter Gutierrez  The Menopause Diet Plan by [facility] AT [facility]  The Complete Guide to fasting by Dr. Scar Nash, [address] [facility] by Pura Clemons, Ph.D, R.D.   Weight Loss Surgery Will Not Treat Food Addiction by Ele Casiano Ph.D  The [address] [facility] on plant based nutrition  Fed Up - documentary about obesity (Free on Utube)  The Truth About Sugar - documentary on sugar (Free on Utube, https://youtu. be/9C9hdlxCM3l)  The Dr. Nayeli Rob by Dr. Geeta Bennett MD  Fitlosophy Fitspiration - journal to better health (found at [employer] in fitness aisle)  What Happened to You?- a look at the impact trauma has on behavior written by Nomi Butterfield and Dr. Steven Teresa Again by Yojana Mathews - discovering your true self after trauma  Paul Paris talk on Chelaile, The Call to Courage  Podcasts: The Exam Room by the Physician's Committee, Nutrition Facts by Dr. Radha Munoz    We are here to support you with weight loss, but please remember that you still need your primary care provider for your routine health maintenance.

## 2022-11-30 ENCOUNTER — OFFICE VISIT (OUTPATIENT)
Dept: INTERNAL MEDICINE CLINIC | Facility: CLINIC | Age: 42
End: 2022-11-30
Payer: COMMERCIAL

## 2022-11-30 DIAGNOSIS — M10.9 GOUT OF BOTH KNEES: ICD-10-CM

## 2022-11-30 DIAGNOSIS — E66.01 SEVERE OBESITY (BMI >= 40) (HCC): ICD-10-CM

## 2022-11-30 DIAGNOSIS — E78.00 HIGH CHOLESTEROL: ICD-10-CM

## 2022-11-30 DIAGNOSIS — R74.8 ELEVATED LIVER ENZYMES: ICD-10-CM

## 2022-11-30 DIAGNOSIS — R73.03 PREDIABETES: ICD-10-CM

## 2022-11-30 DIAGNOSIS — I10 ESSENTIAL HYPERTENSION: ICD-10-CM

## 2022-11-30 PROCEDURE — 97802 MEDICAL NUTRITION INDIV IN: CPT | Performed by: DIETITIAN, REGISTERED

## 2022-12-17 DIAGNOSIS — M10.9 GOUT OF BOTH KNEES: ICD-10-CM

## 2022-12-19 DIAGNOSIS — I10 ESSENTIAL HYPERTENSION: ICD-10-CM

## 2022-12-19 DIAGNOSIS — E78.00 HIGH CHOLESTEROL: ICD-10-CM

## 2022-12-20 RX ORDER — LISINOPRIL AND HYDROCHLOROTHIAZIDE 20; 12.5 MG/1; MG/1
TABLET ORAL
Qty: 90 TABLET | Refills: 1 | Status: SHIPPED | OUTPATIENT
Start: 2022-12-20

## 2022-12-20 RX ORDER — ALLOPURINOL 300 MG/1
TABLET ORAL
Qty: 90 TABLET | Refills: 1 | Status: SHIPPED | OUTPATIENT
Start: 2022-12-20

## 2022-12-20 RX ORDER — ATORVASTATIN CALCIUM 20 MG/1
TABLET, FILM COATED ORAL
Qty: 90 TABLET | Refills: 1 | Status: SHIPPED | OUTPATIENT
Start: 2022-12-20

## 2022-12-28 ENCOUNTER — PATIENT MESSAGE (OUTPATIENT)
Dept: INTERNAL MEDICINE CLINIC | Facility: CLINIC | Age: 42
End: 2022-12-28

## 2022-12-28 RX ORDER — SEMAGLUTIDE 1.34 MG/ML
1 INJECTION, SOLUTION SUBCUTANEOUS WEEKLY
Qty: 9 ML | Refills: 0 | Status: SHIPPED | OUTPATIENT
Start: 2022-12-28

## 2022-12-28 NOTE — TELEPHONE ENCOUNTER
From: Lawrence Alvarado  To: ROCK Sanchez  Sent: 12/28/2022 7:40 AM CST  Subject: Ozempic step-up    Good morning, as of last week I've taken 4 doses of 0.25mg and 4 doses of 0.5mg. Today I am taking 1.0mg which uses up my second pen. Can you please place the order for the full-dose pen to  next week? Also, should I refill the Vitamin D when it runs out in a couple weeks? Thanks, and hope you are having a great holiday season!   Eduarda Hunt

## 2023-01-10 ENCOUNTER — OFFICE VISIT (OUTPATIENT)
Dept: INTERNAL MEDICINE CLINIC | Facility: CLINIC | Age: 43
End: 2023-01-10
Payer: COMMERCIAL

## 2023-01-10 VITALS — WEIGHT: 256 LBS | BODY MASS INDEX: 40 KG/M2

## 2023-01-10 DIAGNOSIS — R74.8 ELEVATED LIVER ENZYMES: ICD-10-CM

## 2023-01-10 DIAGNOSIS — I10 ESSENTIAL HYPERTENSION: ICD-10-CM

## 2023-01-10 DIAGNOSIS — E78.5 DYSLIPIDEMIA: ICD-10-CM

## 2023-01-10 DIAGNOSIS — E66.9 OBESITY, CLASS II, BMI 35-39.9: ICD-10-CM

## 2023-01-10 PROCEDURE — 97803 MED NUTRITION INDIV SUBSEQ: CPT | Performed by: DIETITIAN, REGISTERED

## 2023-01-14 DIAGNOSIS — E55.9 VITAMIN D DEFICIENCY: ICD-10-CM

## 2023-01-16 RX ORDER — ERGOCALCIFEROL 1.25 MG/1
50000 CAPSULE ORAL WEEKLY
Qty: 12 CAPSULE | Refills: 1 | OUTPATIENT
Start: 2023-01-16

## 2023-01-23 ENCOUNTER — OFFICE VISIT (OUTPATIENT)
Dept: INTERNAL MEDICINE CLINIC | Facility: CLINIC | Age: 43
End: 2023-01-23
Payer: COMMERCIAL

## 2023-01-23 VITALS
RESPIRATION RATE: 16 BRPM | BODY MASS INDEX: 39.4 KG/M2 | DIASTOLIC BLOOD PRESSURE: 76 MMHG | WEIGHT: 254 LBS | SYSTOLIC BLOOD PRESSURE: 116 MMHG | HEART RATE: 80 BPM | HEIGHT: 67.5 IN

## 2023-01-23 DIAGNOSIS — E55.9 VITAMIN D DEFICIENCY: ICD-10-CM

## 2023-01-23 DIAGNOSIS — Z51.81 THERAPEUTIC DRUG MONITORING: Primary | ICD-10-CM

## 2023-01-23 DIAGNOSIS — E78.5 DYSLIPIDEMIA: ICD-10-CM

## 2023-01-23 DIAGNOSIS — E66.9 OBESITY, CLASS II, BMI 35-39.9: ICD-10-CM

## 2023-01-23 DIAGNOSIS — R74.8 ELEVATED LIVER ENZYMES: ICD-10-CM

## 2023-01-23 DIAGNOSIS — R73.03 PREDIABETES: ICD-10-CM

## 2023-01-23 DIAGNOSIS — I10 ESSENTIAL HYPERTENSION: ICD-10-CM

## 2023-01-23 PROCEDURE — 3078F DIAST BP <80 MM HG: CPT | Performed by: NURSE PRACTITIONER

## 2023-01-23 PROCEDURE — 99214 OFFICE O/P EST MOD 30 MIN: CPT | Performed by: NURSE PRACTITIONER

## 2023-01-23 PROCEDURE — 3074F SYST BP LT 130 MM HG: CPT | Performed by: NURSE PRACTITIONER

## 2023-01-23 PROCEDURE — 3008F BODY MASS INDEX DOCD: CPT | Performed by: NURSE PRACTITIONER

## 2023-01-23 RX ORDER — SEMAGLUTIDE 1.34 MG/ML
1 INJECTION, SOLUTION SUBCUTANEOUS WEEKLY
Qty: 9 ML | Refills: 0 | Status: CANCELLED | OUTPATIENT
Start: 2023-01-23

## 2023-01-23 NOTE — PATIENT INSTRUCTIONS
Next steps:  1. Fill your prescribed medication and take as discussed and prescribed: ozempic 1mg weekly   2. Schedule a personal nutrition consultation with one of our registered dieticians     Please try to work on the following dietary changes:    1. Drink water with meals and throughout the day, cut down on soda and/or juice if consumed. Consider flavored water options like Bubbly, Spindrift, Hint and Brenda. 2.  Eat breakfast daily and focus on having protein with each meal, examples include: greek yogurt, cottage cheese, hard boiled egg, whole grain toast with peanut butter. 3.  Reduce refined carbohydrates and sugars which includes items such as sweets, as well as rice, pasta, and bread and make sure to choose whole grain options when having them with just 1 serving per meal about the size of your inner palm. 4.  Consume non starchy veggies daily working towards making them a good 50% of your daily food intake. Add them to lunch and dinner consistently. 5.  Start a daily probiotic: VSL#3 is recommended, (order on line at www.vsl3. com). Take 1 capsule daily with water for 30 days, then reduce to 1 every other day (this will reduce the cost). Capsules can be left out for 2 weeks, but then must be refrigerated. Please download luna My Fitness Jaye Dancer! Or Net Diary to monitor daily dietary intake and you will be able to see if you are eating the right amount of calories or too much or too little which would hinder weight loss. Additionally this will help to see your daily carbohydrate and protein intake. When you set the luna up choose 1-2 lbs/week as a goal.  Keeping a paper food journal is an option as well to remain accountable for your choices- this is the start to mindful eating! A low calorie diet has been consistently shown to support weight loss. Continue or start exercising to help establish a routine.  If not already exercising begin with 1 day and progress as able with long-term goal of 30 minutes 5 days a week at a minimum. Meditation daily can help manage and control stress. Chronic stress can make weight loss difficult. Exercising is one way to help with stress, but meditation using the CALM Héctor or another comparable alternative can be done in your home or place of work with little time commitment. This Héctor can also help work on behavior change and improve sleep. Check out the segment under Calm Masterclass and listen to The 4 Pillars of Health. A great way to begin learning about the foundation of lifestyle with practical tips to use in your every day. Check out www.yourweightmatters. org blog for continued daily support and education along this weight loss journey! Patient Resources:     Personal Training/Fitness Classes/Health Coaching     [facility] Medical CenterGUDELIA and [place] @ [email or web address]/ Full fitness center with group fitness and personal training. Discount available as client of [facility]. Health Coaching and Personal Training with Beth Clark at our [facility]- individual weekly coaching with option to add personal training and small group fitness classes targeted at weight loss- [phone] and/or email @ Yobani Allen. [email or web address]  360FIT [place] [email or web address] Group Fitness [phone] and/or email Sujata Willis at [email or web address] GeoSolutions  [address] with multiple locations: Aetna (www.RatherGather), Eat The ArcSight Fitness (www.Vendobots. NEOS GeoSolutions), Fit Body Bootcamp (www.Roundscapesbodybootcamp.NEOS GeoSolutions), [place] (www.Venga), The Exercise  (www.exercisecoach.NEOS GeoSolutions)     Online Fitness  Fitness  on Whole Foods in 10 DVD series- www. paera90DUZ. NEOS GeoSolutions  Sit and Be Fit - Chair exercise series Www.sitandbefit. org  Hip Hop Fit with Ashwin Lawrence at www.hiphopfit. net     Apps for on the Declara 7 Minute Workout (orange box with white 7) - free on the go HIIT training héctor  Peloton Héctor @ www. BoomWriter Media     Nutrition Trackers and Tools  LoseIT! And My Fitness Pal apps and on line for tracking nutrition  NOOM - virtual health coaching  FitFoundation (healthy meals on the go) in [facility]-SCI @ www. ioghqoejrwxnj6d. Meron Patel MD @ www.MedTel24dProspectStream and Ana Ames (keto and low carb plans recommended) @ www. VIMRVX58.ZPQ, Metabolic Meals @ www. Medical Breakthroughs FundMetabolicMeals. com - individual prepared meals to go  [place], Sonatype, [facility], Every [facility], Pre[employer] Ltd- on line meal delivery programs for preparation at home  [place] in [place] for homemade meals to go @ wwwVistar Media  Diet Doctor @ www. dietdoctor. NorSun - low carb swaps  CollegeScoutingReports.com - meal prep and planning héctor (www.yummly. com)     Stress Management/Behavior/Mindful Eating  CALM meditation héctor (www.JoinUp Taxi)  Headspace  Am I Hungry? Mindful eating virtual  héctor  Www.yourweightmatters. org - Obesity Action Coalition sponsored Blog posts daily  Motivation héctor (black box with white \")- daily supportive messages sent to your phone     Books/Video Education/Podcasts  Mindless Eating by Francisca Dao  Why We Get Sick by Priti Woody (a book about insulin resistance)  Atomic Habits by Evertt Barthel (a book about taking small steps to promote greater behavior change)   Can't Hurt Me by Elroy Zendejas (a book exploring the power of discipline in achieving your goals)  The End of Dieting: How to Live for Life by Dr. Lou Rubio M.D. or listen to The [address] [facility] Episode 61: Understanding \"Nutritarian\" Eating w/Dr. Lou Rubio  Your Body in Balance: The World [employer] Services Corporation of Food, Hormones, and Health by Dr. Korin Santiago  The Menopause Diet Plan by Nicolasa Bloch and [facility] HOSP AT [facility]  The Complete Guide to fasting by Dr. Génesis Osuna, [facility] by Vikki Goncalves, Ph.D, R.D.   Weight Loss Surgery Will Not Treat Food Addiction by Jimena Pardo Ph.D  The [facility] on plant based nutrition  Fed Up - documentary about obesity (Free on Utube)  The Truth About Sugar - documentary on sugar (Free on Utube, https://youtu. be/9G4qwvkHN7p)  The Dr. Ruffin Gaucher by Dr. Bard Carla MD  Fitlosophy Fitspiration - journal to better health (found at [employer] in fitness aisle)  What Happened to You?- a look at the impact trauma has on behavior written by Giuseppe Timmons and Dr. Courtney Vásquez Again by Florence Zhu - discovering your true self after trauma  Karena Amezquita talk on SuperGen, The Call to Courage  Podcasts: The Exam Room by the Physician's Committee, Nutrition Facts by Dr. Kelsy Fernandez    We are here to support you with weight loss, but please remember that you still need your primary care provider for your routine health maintenance.

## 2023-02-06 ENCOUNTER — OFFICE VISIT (OUTPATIENT)
Dept: FAMILY MEDICINE CLINIC | Facility: CLINIC | Age: 43
End: 2023-02-06
Payer: COMMERCIAL

## 2023-02-06 ENCOUNTER — LAB ENCOUNTER (OUTPATIENT)
Dept: LAB | Age: 43
End: 2023-02-06
Attending: FAMILY MEDICINE
Payer: COMMERCIAL

## 2023-02-06 VITALS
HEIGHT: 67.5 IN | OXYGEN SATURATION: 99 % | BODY MASS INDEX: 39.32 KG/M2 | WEIGHT: 253.5 LBS | DIASTOLIC BLOOD PRESSURE: 70 MMHG | TEMPERATURE: 98 F | SYSTOLIC BLOOD PRESSURE: 110 MMHG | RESPIRATION RATE: 16 BRPM | HEART RATE: 83 BPM

## 2023-02-06 DIAGNOSIS — E78.00 HIGH CHOLESTEROL: ICD-10-CM

## 2023-02-06 DIAGNOSIS — Z00.00 ROUTINE ADULT HEALTH MAINTENANCE: Primary | ICD-10-CM

## 2023-02-06 DIAGNOSIS — M10.9 GOUT OF BOTH KNEES: ICD-10-CM

## 2023-02-06 DIAGNOSIS — R73.03 PREDIABETES: ICD-10-CM

## 2023-02-06 DIAGNOSIS — Z00.00 ROUTINE ADULT HEALTH MAINTENANCE: ICD-10-CM

## 2023-02-06 DIAGNOSIS — I10 ESSENTIAL HYPERTENSION: ICD-10-CM

## 2023-02-06 DIAGNOSIS — E66.01 CLASS 3 SEVERE OBESITY DUE TO EXCESS CALORIES WITHOUT SERIOUS COMORBIDITY WITH BODY MASS INDEX (BMI) OF 40.0 TO 44.9 IN ADULT (HCC): ICD-10-CM

## 2023-02-06 DIAGNOSIS — Z23 NEED FOR VACCINATION: ICD-10-CM

## 2023-02-06 LAB
ALBUMIN SERPL-MCNC: 3.9 G/DL (ref 3.4–5)
ALBUMIN/GLOB SERPL: 1.2 {RATIO} (ref 1–2)
ALP LIVER SERPL-CCNC: 75 U/L
ALT SERPL-CCNC: 53 U/L
ANION GAP SERPL CALC-SCNC: 4 MMOL/L (ref 0–18)
AST SERPL-CCNC: 22 U/L (ref 15–37)
BASOPHILS # BLD AUTO: 0.06 X10(3) UL (ref 0–0.2)
BASOPHILS NFR BLD AUTO: 0.7 %
BILIRUB SERPL-MCNC: 0.3 MG/DL (ref 0.1–2)
BUN BLD-MCNC: 14 MG/DL (ref 7–18)
CALCIUM BLD-MCNC: 9.6 MG/DL (ref 8.5–10.1)
CHLORIDE SERPL-SCNC: 107 MMOL/L (ref 98–112)
CHOLEST SERPL-MCNC: 111 MG/DL (ref ?–200)
CO2 SERPL-SCNC: 29 MMOL/L (ref 21–32)
CREAT BLD-MCNC: 1.05 MG/DL
EOSINOPHIL # BLD AUTO: 0.15 X10(3) UL (ref 0–0.7)
EOSINOPHIL NFR BLD AUTO: 1.8 %
ERYTHROCYTE [DISTWIDTH] IN BLOOD BY AUTOMATED COUNT: 13.8 %
EST. AVERAGE GLUCOSE BLD GHB EST-MCNC: 111 MG/DL (ref 68–126)
FASTING PATIENT LIPID ANSWER: YES
FASTING STATUS PATIENT QL REPORTED: YES
GFR SERPLBLD BASED ON 1.73 SQ M-ARVRAT: 91 ML/MIN/1.73M2 (ref 60–?)
GLOBULIN PLAS-MCNC: 3.3 G/DL (ref 2.8–4.4)
GLUCOSE BLD-MCNC: 94 MG/DL (ref 70–99)
HBA1C MFR BLD: 5.5 % (ref ?–5.7)
HCT VFR BLD AUTO: 47 %
HDLC SERPL-MCNC: 33 MG/DL (ref 40–59)
HGB BLD-MCNC: 15.4 G/DL
IMM GRANULOCYTES # BLD AUTO: 0.01 X10(3) UL (ref 0–1)
IMM GRANULOCYTES NFR BLD: 0.1 %
LDLC SERPL CALC-MCNC: 53 MG/DL (ref ?–100)
LYMPHOCYTES # BLD AUTO: 3.01 X10(3) UL (ref 1–4)
LYMPHOCYTES NFR BLD AUTO: 36.9 %
MCH RBC QN AUTO: 30.3 PG (ref 26–34)
MCHC RBC AUTO-ENTMCNC: 32.8 G/DL (ref 31–37)
MCV RBC AUTO: 92.5 FL
MONOCYTES # BLD AUTO: 0.47 X10(3) UL (ref 0.1–1)
MONOCYTES NFR BLD AUTO: 5.8 %
NEUTROPHILS # BLD AUTO: 4.46 X10 (3) UL (ref 1.5–7.7)
NEUTROPHILS # BLD AUTO: 4.46 X10(3) UL (ref 1.5–7.7)
NEUTROPHILS NFR BLD AUTO: 54.7 %
NONHDLC SERPL-MCNC: 78 MG/DL (ref ?–130)
OSMOLALITY SERPL CALC.SUM OF ELEC: 290 MOSM/KG (ref 275–295)
PLATELET # BLD AUTO: 231 10(3)UL (ref 150–450)
POTASSIUM SERPL-SCNC: 4.3 MMOL/L (ref 3.5–5.1)
PROT SERPL-MCNC: 7.2 G/DL (ref 6.4–8.2)
RBC # BLD AUTO: 5.08 X10(6)UL
SODIUM SERPL-SCNC: 140 MMOL/L (ref 136–145)
T4 FREE SERPL-MCNC: 0.9 NG/DL (ref 0.8–1.7)
TRIGL SERPL-MCNC: 145 MG/DL (ref 30–149)
TSI SER-ACNC: 1.11 MIU/ML (ref 0.36–3.74)
VLDLC SERPL CALC-MCNC: 21 MG/DL (ref 0–30)
WBC # BLD AUTO: 8.2 X10(3) UL (ref 4–11)

## 2023-02-06 PROCEDURE — 90471 IMMUNIZATION ADMIN: CPT | Performed by: FAMILY MEDICINE

## 2023-02-06 PROCEDURE — 85025 COMPLETE CBC W/AUTO DIFF WBC: CPT

## 2023-02-06 PROCEDURE — 84443 ASSAY THYROID STIM HORMONE: CPT

## 2023-02-06 PROCEDURE — 84439 ASSAY OF FREE THYROXINE: CPT

## 2023-02-06 PROCEDURE — 80053 COMPREHEN METABOLIC PANEL: CPT

## 2023-02-06 PROCEDURE — 99396 PREV VISIT EST AGE 40-64: CPT | Performed by: FAMILY MEDICINE

## 2023-02-06 PROCEDURE — 3078F DIAST BP <80 MM HG: CPT | Performed by: FAMILY MEDICINE

## 2023-02-06 PROCEDURE — 3074F SYST BP LT 130 MM HG: CPT | Performed by: FAMILY MEDICINE

## 2023-02-06 PROCEDURE — 3008F BODY MASS INDEX DOCD: CPT | Performed by: FAMILY MEDICINE

## 2023-02-06 PROCEDURE — 90715 TDAP VACCINE 7 YRS/> IM: CPT | Performed by: FAMILY MEDICINE

## 2023-02-06 PROCEDURE — 83036 HEMOGLOBIN GLYCOSYLATED A1C: CPT

## 2023-02-06 PROCEDURE — 36415 COLL VENOUS BLD VENIPUNCTURE: CPT

## 2023-02-06 PROCEDURE — 80061 LIPID PANEL: CPT

## 2023-02-06 PROCEDURE — 90472 IMMUNIZATION ADMIN EACH ADD: CPT | Performed by: FAMILY MEDICINE

## 2023-02-06 RX ORDER — ATORVASTATIN CALCIUM 20 MG/1
20 TABLET, FILM COATED ORAL DAILY
Qty: 90 TABLET | Refills: 1 | Status: SHIPPED | OUTPATIENT
Start: 2023-02-06

## 2023-02-06 RX ORDER — ALLOPURINOL 300 MG/1
300 TABLET ORAL DAILY
Qty: 90 TABLET | Refills: 1 | Status: SHIPPED | OUTPATIENT
Start: 2023-02-06

## 2023-02-06 RX ORDER — LISINOPRIL AND HYDROCHLOROTHIAZIDE 20; 12.5 MG/1; MG/1
1 TABLET ORAL DAILY
Qty: 90 TABLET | Refills: 1 | Status: SHIPPED | OUTPATIENT
Start: 2023-02-06

## 2023-02-07 DIAGNOSIS — M10.9 GOUT OF BOTH KNEES: Primary | ICD-10-CM

## 2023-02-07 DIAGNOSIS — E78.00 HIGH CHOLESTEROL: ICD-10-CM

## 2023-03-15 ENCOUNTER — OFFICE VISIT (OUTPATIENT)
Dept: INTERNAL MEDICINE CLINIC | Facility: CLINIC | Age: 43
End: 2023-03-15
Payer: COMMERCIAL

## 2023-03-15 VITALS
RESPIRATION RATE: 18 BRPM | SYSTOLIC BLOOD PRESSURE: 116 MMHG | DIASTOLIC BLOOD PRESSURE: 76 MMHG | BODY MASS INDEX: 39.81 KG/M2 | HEIGHT: 67.5 IN | HEART RATE: 80 BPM | WEIGHT: 256.63 LBS | OXYGEN SATURATION: 96 %

## 2023-03-15 DIAGNOSIS — E78.5 DYSLIPIDEMIA: ICD-10-CM

## 2023-03-15 DIAGNOSIS — Z51.81 THERAPEUTIC DRUG MONITORING: Primary | ICD-10-CM

## 2023-03-15 DIAGNOSIS — E55.9 VITAMIN D DEFICIENCY: ICD-10-CM

## 2023-03-15 DIAGNOSIS — R74.8 ELEVATED LIVER ENZYMES: ICD-10-CM

## 2023-03-15 DIAGNOSIS — R73.03 PREDIABETES: ICD-10-CM

## 2023-03-15 DIAGNOSIS — I10 ESSENTIAL HYPERTENSION: ICD-10-CM

## 2023-03-15 DIAGNOSIS — E66.9 OBESITY, CLASS II, BMI 35-39.9: ICD-10-CM

## 2023-03-15 PROCEDURE — 3008F BODY MASS INDEX DOCD: CPT | Performed by: NURSE PRACTITIONER

## 2023-03-15 PROCEDURE — 99214 OFFICE O/P EST MOD 30 MIN: CPT | Performed by: NURSE PRACTITIONER

## 2023-03-15 PROCEDURE — 3074F SYST BP LT 130 MM HG: CPT | Performed by: NURSE PRACTITIONER

## 2023-03-15 PROCEDURE — 3078F DIAST BP <80 MM HG: CPT | Performed by: NURSE PRACTITIONER

## 2023-03-15 RX ORDER — SEMAGLUTIDE 1.34 MG/ML
1 INJECTION, SOLUTION SUBCUTANEOUS WEEKLY
Qty: 9 ML | Refills: 0 | Status: SHIPPED | OUTPATIENT
Start: 2023-03-15

## 2023-03-15 NOTE — PATIENT INSTRUCTIONS
Next steps:  1. Fill your prescribed medication and take as discussed and prescribed: ozempic 1mg weekly   2. Schedule a personal nutrition consultation with one of our registered dieticians   3. Get outpatient ekg done    1. Drink water with meals and throughout the day, cut down on soda and/or juice if consumed. Consider flavored water options like Bubbly, Spindrift, Hint and Brenda. 2.  Eat breakfast daily and focus on having protein with each meal, examples include: greek yogurt, cottage cheese, hard boiled egg, whole grain toast with peanut butter. 3.  Reduce refined carbohydrates and sugars which includes items such as sweets, as well as rice, pasta, and bread and make sure to choose whole grain options when having them with just 1 serving per meal about the size of your inner palm. 4.  Consume non starchy veggies daily working towards making them a good 50% of your daily food intake. Add them to lunch and dinner consistently. 5.  Start a daily probiotic: VSL#3 is recommended, (order on line at www.vsl3. com). Take 1 capsule daily with water for 30 days, then reduce to 1 every other day (this will reduce the cost). Capsules can be left out for 2 weeks, but then must be refrigerated. Please download luna My Fitness Nita Cabrera! Or Net Diary to monitor daily dietary intake and you will be able to see if you are eating the right amount of calories or too much or too little which would hinder weight loss. Additionally this will help to see your daily carbohydrate and protein intake. When you set the luna up choose 1-2 lbs/week as a goal.  Keeping a paper food journal is an option as well to remain accountable for your choices- this is the start to mindful eating! A low calorie diet has been consistently shown to support weight loss. Continue or start exercising to help establish a routine.  If not already exercising begin with 1 day and progress as able with long-term goal of 30 minutes 5 days a week at a minimum. Meditation daily can help manage and control stress. Chronic stress can make weight loss difficult. Exercising is one way to help with stress, but meditation using the CALM Héctor or another comparable alternative can be done in your home or place of work with little time commitment. This Héctor can also help work on behavior change and improve sleep. Check out the segment under Calm Masterclass and listen to The 4 Pillars of Health. A great way to begin learning about the foundation of lifestyle with practical tips to use in your every day. Check out www.yourweightmatters. org blog for continued daily support and education along this weight loss journey! Patient Resources:     Personal Training/Fitness Classes/Health Coaching     Eric Felipe and Lira Sophiaside @ [email or web address]/ Full fitness center with group fitness and personal training. Discount available as client of [facility]. Health Coaching and Personal Training with Fatou Hoffman at our [facility]- individual weekly coaching with option to add personal training and small group fitness classes targeted at weight loss- [phone] and/or email @ [email or web address]  360FIT [place] [email or web address] Group Fitness [phone] and/or email Nikolas kiran at [email or web address]  [address] [facility] with multiple locations: Aetna (www.viblast), Eat The [employer] Fitness (www.Is That Odd. Skin Scan), Fit Body Bootcamp (www.Body & Soulbodybootcamp.Skin Scan), Karma Gaming (www.iValidate.me), The Exercise  (www.exercisecoach.Skin Scan)     Online Fitness  Fitness  on Whole Foods in 10 DVD series- www. ibocx73PKV. Skin Scan  Sit and Be Fit - Chair exercise series Www.sitandbefit. org  Hip Hop Fit with Ashwin Lawrence at www.hiphopfit. net     Apps for on the Go Fitness  [place] 7 Minute Workout (orange box with white 7) - free on the go HIIT training héctor  Peloton Héctor @ www. Corrupt Lace     Nutrition Trackers and Tools  LoseIT! And My Fitness Pal apps and on line for tracking nutrition  NOOM - virtual health coaching  FitFoundation (healthy meals on the go) in [facility] @ www. hsahkdnnrnpnv4n. Caroll Brunner MD @ www.MetabacusdTrigence and Rina Davey (keto and low carb plans recommended) @ www. BHSBHD05.UFV, Metabolic Meals @ www. Dragon InsideMetabolicMeals. com - individual prepared meals to go  Marco Vasco, Txt4, [employer], Every Plate, Appiterate- on line meal delivery programs for preparation at home  [place] [employer] in [employer] for homemade meals to go @ www.mealClarisonic. TrabajoPanel  Diet Doctor @ www. dietdoctor. TrabajoPanel - low carb swaps  YummGoNabit - meal prep and planning héctor (www.yummly. com)     Stress Management/Behavior/Mindful Eating  CALM meditation héctor (www.Abound Logic)  Headspace  Am I Hungry? Mindful eating virtual  héctor  Www.yourweightmatters. org - Obesity Action Coalition sponsored Blog posts daily  Motivation héctor (black box with white \")- daily supportive messages sent to your phone     Books/Video Education/Podcasts  Mindless Eating by Fox Pickett  Why We Get Sick by Jackelyn Arreaga (a book about insulin resistance)  Atomic Habits by Dell Crystal (a book about taking small steps to promote greater behavior change)   Can't Hurt Me by Laney Ivory (a book exploring the power of discipline in achieving your goals)  The End of Dieting: How to Live for Life by Dr. Diya Anne M.D. or listen to The [facility] Episode 61: Understanding \"Nutritarian\" Eating w/Dr. Diya Anne  Your Body in Balance: The World [employer] of Food, Hormones, and Health by Dr. Billie Correia  The Menopause Diet Plan by Maricruz Louise and [facility] AT [facility]  The Complete Guide to fasting by Dr. Hiram Ledezma, [address] [facility] by Aaliyah Banuelos, Ph.D, R.D.   Weight Loss Surgery Will Not Treat Food Addiction by Yaquelin Yadav Ph.D  The [address] [place] [address] on plant based nutrition  Fed Up - documentary about obesity (Free on Utube)  The Truth About Sugar - documentary on sugar (Free on Utube, https://youtu. be/7D1bobaXB3k)  The Dr. Tello Guy by Dr. Tee Ventura MD  Fitlosophy Fitspiration - journal to better health (found at [employer] in fitness aisle)  What Happened to You?- a look at the impact trauma has on behavior written by Stephanie Lira and Dr. Hayley Zapata Again by Queenie Rubalcava - discovering your true self after trauma  Mily Garcia talk on [employer], The Call to Courage  Podcasts: The Exam Room by the Physician's Committee, Nutrition Facts by Dr. Ursula Layton    We are here to support you with weight loss, but please remember that you still need your primary care provider for your routine health maintenance.

## 2023-03-20 ENCOUNTER — EKG ENCOUNTER (OUTPATIENT)
Dept: LAB | Age: 43
End: 2023-03-20
Attending: NURSE PRACTITIONER
Payer: COMMERCIAL

## 2023-03-20 DIAGNOSIS — E66.9 OBESITY, CLASS II, BMI 35-39.9: ICD-10-CM

## 2023-03-20 DIAGNOSIS — Z51.81 THERAPEUTIC DRUG MONITORING: ICD-10-CM

## 2023-03-20 LAB
ATRIAL RATE: 75 BPM
P AXIS: 22 DEGREES
P-R INTERVAL: 156 MS
Q-T INTERVAL: 380 MS
QRS DURATION: 126 MS
QTC CALCULATION (BEZET): 424 MS
R AXIS: 62 DEGREES
T AXIS: 45 DEGREES
VENTRICULAR RATE: 75 BPM

## 2023-03-21 ENCOUNTER — OFFICE VISIT (OUTPATIENT)
Dept: INTERNAL MEDICINE CLINIC | Facility: CLINIC | Age: 43
End: 2023-03-21
Payer: COMMERCIAL

## 2023-03-21 VITALS — BODY MASS INDEX: 39 KG/M2 | WEIGHT: 255 LBS

## 2023-03-21 DIAGNOSIS — I10 ESSENTIAL HYPERTENSION: ICD-10-CM

## 2023-03-21 DIAGNOSIS — E78.00 HIGH CHOLESTEROL: ICD-10-CM

## 2023-03-21 DIAGNOSIS — R74.8 ELEVATED LIVER ENZYMES: ICD-10-CM

## 2023-03-21 DIAGNOSIS — E66.9 OBESITY, CLASS II, BMI 35-39.9: ICD-10-CM

## 2023-03-21 PROCEDURE — 97802 MEDICAL NUTRITION INDIV IN: CPT | Performed by: DIETITIAN, REGISTERED

## 2023-03-24 ENCOUNTER — PATIENT MESSAGE (OUTPATIENT)
Dept: INTERNAL MEDICINE CLINIC | Facility: CLINIC | Age: 43
End: 2023-03-24

## 2023-03-27 NOTE — TELEPHONE ENCOUNTER
From: Terrence Lynn  To: ROCK Parsons  Sent: 3/24/2023 5:43 PM CDT  Subject: Question regarding EKG 12-LEAD    Hello, this is the first EKG I've ever had done. Should I go for a second test to compare? Any immediate concern?

## 2023-03-29 ENCOUNTER — LAB ENCOUNTER (OUTPATIENT)
Dept: LAB | Age: 43
End: 2023-03-29
Attending: INTERNAL MEDICINE
Payer: COMMERCIAL

## 2023-03-29 DIAGNOSIS — R79.89 ELEVATED LFTS: ICD-10-CM

## 2023-03-29 LAB
ALBUMIN SERPL-MCNC: 3.9 G/DL (ref 3.4–5)
ALP LIVER SERPL-CCNC: 81 U/L
ALT SERPL-CCNC: 64 U/L
AST SERPL-CCNC: 41 U/L (ref 15–37)
BILIRUB DIRECT SERPL-MCNC: <0.1 MG/DL (ref 0–0.2)
BILIRUB SERPL-MCNC: 0.3 MG/DL (ref 0.1–2)
PROT SERPL-MCNC: 7.1 G/DL (ref 6.4–8.2)

## 2023-03-29 PROCEDURE — 80076 HEPATIC FUNCTION PANEL: CPT

## 2023-03-29 PROCEDURE — 36415 COLL VENOUS BLD VENIPUNCTURE: CPT

## 2023-03-30 ENCOUNTER — HOSPITAL ENCOUNTER (OUTPATIENT)
Dept: CV DIAGNOSTICS | Facility: HOSPITAL | Age: 43
Discharge: HOME | End: 2023-03-30
Attending: NURSE PRACTITIONER
Payer: COMMERCIAL

## 2023-03-30 DIAGNOSIS — E66.9 OBESITY, CLASS II, BMI 35-39.9: ICD-10-CM

## 2023-03-30 DIAGNOSIS — E78.00 HIGH CHOLESTEROL: ICD-10-CM

## 2023-03-30 DIAGNOSIS — I10 ESSENTIAL HYPERTENSION: ICD-10-CM

## 2023-03-30 DIAGNOSIS — R94.31 ABNORMAL EKG: ICD-10-CM

## 2023-03-30 PROBLEM — G47.33 OSA ON CPAP: Status: ACTIVE | Noted: 2023-02-06

## 2023-03-30 PROBLEM — Z99.89 OSA ON CPAP: Status: ACTIVE | Noted: 2023-02-06

## 2023-03-30 PROCEDURE — 93018 CV STRESS TEST I&R ONLY: CPT | Performed by: NURSE PRACTITIONER

## 2023-03-30 PROCEDURE — 93017 CV STRESS TEST TRACING ONLY: CPT | Performed by: NURSE PRACTITIONER

## 2023-03-30 PROCEDURE — 93350 STRESS TTE ONLY: CPT | Performed by: NURSE PRACTITIONER

## 2023-03-31 ENCOUNTER — PATIENT MESSAGE (OUTPATIENT)
Dept: INTERNAL MEDICINE CLINIC | Facility: CLINIC | Age: 43
End: 2023-03-31

## 2023-03-31 NOTE — TELEPHONE ENCOUNTER
From: David Luo  To: ROCK Hernandez  Sent: 3/31/2023 1:25 PM CDT  Subject: Question regarding CARD ECHO STRESS ECHO/REST AND STRESS (CPT=93350/75205 [facility] [address])    Hello, neetu for taking a look. Yes, let's go ahead with the phentermine as we discussed. How long should this course last, and should I plan to follow up during/after?

## 2023-04-02 RX ORDER — PHENTERMINE HYDROCHLORIDE 15 MG/1
15 CAPSULE ORAL EVERY MORNING
Qty: 30 CAPSULE | Refills: 1 | Status: SHIPPED | OUTPATIENT
Start: 2023-04-02

## 2023-05-08 ENCOUNTER — PATIENT MESSAGE (OUTPATIENT)
Dept: INTERNAL MEDICINE CLINIC | Facility: CLINIC | Age: 43
End: 2023-05-08

## 2023-05-10 NOTE — TELEPHONE ENCOUNTER
From: Lori Timmons  To: ROCK Benjamin  Sent: 5/8/2023 4:19 PM CDT  Subject: Travel plans, prior auth, and follow-up    Hello, I have a few things going on and just want to review my plans with you in advance. I'm leaving my job, last day June 2, and I'm taking a vacation [place] June 17-July 3.    --I have my next follow-up July 5. The check-in process asks if I've traveled out of country; will it create any concern if the answer is \"yes\"? --I got a letter from insurance saying that starting 6/1, a prior authorization is needed for my Ozempic 1mg. The letter says to have you call [phone] before next refill. I have 6 weekly doses left and will run out just before my trip. --After my job ends, I'm not sure what coverage will look like, if I'll stay on my current plan or if I'll switch right away to an exchange plan. .. need to run some numbers, I haven't seen the COBRA cost yet, and I hit my deductible with $3k to reach out-of-pocket max. Open to suggestions if you're able to give them. Lastly, to follow-up. .. 30 days in, the phentermine is going well. I'm down to about 245lb this morning. No issues so far. Just ordered my refill today.     Thanks,  Marlene Luu

## 2023-06-13 ENCOUNTER — TELEPHONE (OUTPATIENT)
Dept: INTERNAL MEDICINE CLINIC | Facility: CLINIC | Age: 43
End: 2023-06-13

## 2023-07-05 ENCOUNTER — OFFICE VISIT (OUTPATIENT)
Dept: INTERNAL MEDICINE CLINIC | Facility: CLINIC | Age: 43
End: 2023-07-05
Payer: COMMERCIAL

## 2023-07-05 VITALS
HEART RATE: 88 BPM | DIASTOLIC BLOOD PRESSURE: 80 MMHG | SYSTOLIC BLOOD PRESSURE: 118 MMHG | BODY MASS INDEX: 38.16 KG/M2 | OXYGEN SATURATION: 98 % | WEIGHT: 246 LBS | RESPIRATION RATE: 16 BRPM | HEIGHT: 67.5 IN

## 2023-07-05 DIAGNOSIS — Z51.81 THERAPEUTIC DRUG MONITORING: Primary | ICD-10-CM

## 2023-07-05 DIAGNOSIS — E55.9 VITAMIN D DEFICIENCY: ICD-10-CM

## 2023-07-05 DIAGNOSIS — R74.8 ELEVATED LIVER ENZYMES: ICD-10-CM

## 2023-07-05 DIAGNOSIS — E78.5 DYSLIPIDEMIA: ICD-10-CM

## 2023-07-05 DIAGNOSIS — E66.9 OBESITY, CLASS II, BMI 35-39.9: ICD-10-CM

## 2023-07-05 DIAGNOSIS — R73.03 PREDIABETES: ICD-10-CM

## 2023-07-05 DIAGNOSIS — E78.00 HIGH CHOLESTEROL: ICD-10-CM

## 2023-07-05 DIAGNOSIS — I10 ESSENTIAL HYPERTENSION: ICD-10-CM

## 2023-07-05 PROCEDURE — 3079F DIAST BP 80-89 MM HG: CPT | Performed by: NURSE PRACTITIONER

## 2023-07-05 PROCEDURE — 99213 OFFICE O/P EST LOW 20 MIN: CPT | Performed by: NURSE PRACTITIONER

## 2023-07-05 PROCEDURE — 3008F BODY MASS INDEX DOCD: CPT | Performed by: NURSE PRACTITIONER

## 2023-07-05 PROCEDURE — 3074F SYST BP LT 130 MM HG: CPT | Performed by: NURSE PRACTITIONER

## 2023-07-05 NOTE — PATIENT INSTRUCTIONS
Next steps:  1. Fill your prescribed medication and take as discussed and prescribed: phentermine and ozempic (check to see if insurance will cover wegovy or saxenda)   2. Schedule a personal nutrition consultation with one of our registered dieticians     Please try to work on the following dietary changes:  Daily protein recommendation to start:  grams  Daily carbohydrate: <150g  Daily calories: 1,800- 1,900  1. Drink water with meals and throughout the day, cut down on soda and/or juice if consumed. Consider flavored water options like Bubbly, Spindrift, Hint and Brenda. 2.  Eat breakfast daily and focus on having protein with each meal, examples include: greek yogurt, cottage cheese, hard boiled egg, whole grain toast with peanut butter. 3.  Reduce refined carbohydrates and sugars which includes items such as sweets, as well as rice, pasta, and bread and make sure to choose whole grain options when having them with just 1 serving per meal about the size of your inner palm. 4.  Consume non starchy veggies daily working towards making them a good 50% of your daily food intake. Add them to lunch and dinner consistently. 5.  Start a daily probiotic: VSL#3 is recommended, (order on line at www.vsl3. com). Take 1 capsule daily with water for 30 days, then reduce to 1 every other day (this will reduce the cost). Capsules can be left out for 2 weeks, but then must be refrigerated. Please download luna My Fitness [place] Creed! Or Net Diary to monitor daily dietary intake and you will be able to see if you are eating the right amount of calories or too much or too little which would hinder weight loss. Additionally this will help to see your daily carbohydrate and protein intake. When you set the luna up choose 1-2 lbs/week as a goal.  Keeping a paper food journal is an option as well to remain accountable for your choices- this is the start to mindful eating!  A low calorie diet has been consistently shown to support weight loss. Continue or start exercising to help establish a routine. If not already exercising begin with 1 day and progress as able with long-term goal of 30 minutes 5 days a week at a minimum. Meditation daily can help manage and control stress. Chronic stress can make weight loss difficult. Exercising is one way to help with stress, but meditation using the CALM Héctor or another comparable alternative can be done in your home or place of work with little time commitment. This Héctor can also help work on behavior change and improve sleep. Check out the segment under Calm Masterclass and listen to The 4 Pillars of Health. A great way to begin learning about the foundation of lifestyle with practical tips to use in your every day. Check out www.yourweightmatters. org blog for continued daily support and education along this weight loss journey! Patient Resources:     Personal Training/Fitness Classes/Health Coaching     Eric Felipe and Lira Sophiaside @ [email or web address]/ Full fitness center with group fitness and personal training. Discount available as client of [facility]. Health Coaching and Personal Training with Aj Santana at our [facility]- individual weekly coaching with option to add personal training and small group fitness classes targeted at weight loss- [phone] and/or email @ [email or web address]  360FIT [place] [email or web address] Group Fitness [phone] and/or email Fanta Aquino at [email or web address]  [address] [facility] with multiple locations: Aetna (www.Weblance. Modusly), Eat The Frog Fitness (www.MicroGREEN Polymers. Modusly), Fit Body Bootcamp (www.REPUCOMbodybootcamp.Modusly), MTX Connect Fitness (www.Evolven Software. Modusly), The Exercise  (www.exercisecoach.Modusly)     Online Fitness  Fitness  on Whole Foods in 10 DVD series- www. rsctg40CCE. Modusly  Sit and Be Fit - Chair exercise series Www.sitandbefit. org  Hip Hop Fit with Ashwin Lawrence at www.hiphopfit. net     Apps for on the OnForce 7 Minute Workout (orange box with white 7) - free on the go HIIT training héctor  Peloton Héctor @ [employer]     Nutrition Trackers and Tools  LoseIT! And My Fitness Pal apps and on line for tracking nutrition  NOOM - virtual health coaching  FitFoundation (healthy meals on the go) in [facility] @ www. mmueitjrihqgh4c. Skelijah Byrne MD @ www.bistromd.com and Raffi Luciano (keto and low carb plans recommended) @ www. NZLNAS84.XBP, Metabolic Meals @ www. Silent CircleabolicMeals. com - individual prepared meals to go  Sonnedix, Silent Circle, [employer] Machines, Every Plate, BNI Video- on line meal delivery programs for preparation at home  [place] [employer] in [place] for homemade meals to go @ wwwPixtronixmealTime Warden. minicabit  Diet Doctor @ www. dietdoctor. minicabit - low carb swaps  YummPyreos - meal prep and planning héctor (www.yummly. com)     Stress Management/Behavior/Mindful Eating  CALM meditation héctor (www.calmVoodooVox)  Headspace  Am I Hungry? Mindful eating virtual  héctro  Www.yourweightmatters. org - Obesity Action Coalition sponsored Blog posts daily  Motivation héctor (black box with white \")- daily supportive messages sent to your phone     Books/Video Education/Podcasts  Mindless Eating by Deirdre Romo  Why We Get Sick by Cari Chilel (a book about insulin resistance)  Atomic Habits by Son Carter (a book about taking small steps to promote greater behavior change)   Can't Hurt Me by Basil Cid (a book exploring the power of discipline in achieving your goals)  The End of Dieting: How to Live for Life by Dr. Padilla Mullins M.D. or listen to The [address] Episode 61: Understanding \"Nutritarian\" Eating w/Dr. Padilla Mullins  Your Body in Balance:  The World [employer] of Food, Hormones, and Health by Dr. Bandar Mattson  The Menopause Diet Plan by Gustavo Rodriguez and [facility]  The Complete Guide to fasting by  Kely  Sugar, Salt & Fat by Jayashree Watt, Ph.D, R.D. Weight Loss Surgery Will Not Treat Food Addiction by Eric Serna Ph.D  The [address] [place] [address] on plant based nutrition  Fed Up - documentary about obesity (Free on New MichaelRivertonn)  The Truth About Sugar - documentary on sugar (Free on Utube, https://youtu. be/1S1fkojQE1b)  The Dr. Anuradha Patel by Dr. Shoaib Workman MD  Fitlosophy Fitspiration - journal to better health (found at [employer] in fitness aisle)  What Happened to You?- a look at the impact trauma has on behavior written by Mello Gallardo and Dr. Fabiola Bueno Again by Asha Winston - discovering your true self after trauma  Susen Osgood talk on [employer], The Call to Courage  Podcasts: The Exam Room by the Physician's Committee, Nutrition Facts by Dr. Joe Alcantara    We are here to support you with weight loss, but please remember that you still need your primary care provider for your routine health maintenance.

## 2023-07-06 ENCOUNTER — PATIENT MESSAGE (OUTPATIENT)
Dept: INTERNAL MEDICINE CLINIC | Facility: CLINIC | Age: 43
End: 2023-07-06

## 2023-07-06 DIAGNOSIS — E66.9 OBESITY, CLASS II, BMI 35-39.9: Primary | ICD-10-CM

## 2023-07-06 DIAGNOSIS — Z51.81 THERAPEUTIC DRUG MONITORING: ICD-10-CM

## 2023-07-08 NOTE — TELEPHONE ENCOUNTER
From: Odin Lopez  To: ROCK Keating  Sent: 7/6/2023 3:59 PM CDT  Subject: Ozempic/alternatives & ins coverage    Hello, I was able to confirm that [facility] and [employer] are not covered by my new plan. Diana Cha is listed as \"step up required\" with no further information on what that means.  Based on what we discussed yesterday, should I plan to increase my phentermine dose, or is there anything else we want to try first?  Thanks,  Rajat Judd

## 2023-07-09 RX ORDER — PHENTERMINE HYDROCHLORIDE 37.5 MG/1
37.5 TABLET ORAL
Qty: 30 TABLET | Refills: 1 | Status: SHIPPED | OUTPATIENT
Start: 2023-07-09

## 2023-08-15 ENCOUNTER — LAB ENCOUNTER (OUTPATIENT)
Dept: LAB | Age: 43
End: 2023-08-15
Attending: FAMILY MEDICINE
Payer: COMMERCIAL

## 2023-08-15 ENCOUNTER — LAB ENCOUNTER (OUTPATIENT)
Dept: LAB | Age: 43
End: 2023-08-15
Attending: NURSE PRACTITIONER
Payer: COMMERCIAL

## 2023-08-15 DIAGNOSIS — M10.9 GOUT OF BOTH KNEES: ICD-10-CM

## 2023-08-15 DIAGNOSIS — E78.00 HIGH CHOLESTEROL: ICD-10-CM

## 2023-08-15 DIAGNOSIS — R74.8 ELEVATED LIVER ENZYMES: ICD-10-CM

## 2023-08-15 DIAGNOSIS — K76.0 FATTY LIVER: ICD-10-CM

## 2023-08-15 LAB
ALBUMIN SERPL-MCNC: 3.9 G/DL (ref 3.4–5)
ALBUMIN SERPL-MCNC: 3.9 G/DL (ref 3.4–5)
ALBUMIN/GLOB SERPL: 1.2 {RATIO} (ref 1–2)
ALP LIVER SERPL-CCNC: 72 U/L
ALP LIVER SERPL-CCNC: 72 U/L
ALT SERPL-CCNC: 47 U/L
ALT SERPL-CCNC: 47 U/L
ANION GAP SERPL CALC-SCNC: 2 MMOL/L (ref 0–18)
AST SERPL-CCNC: 19 U/L (ref 15–37)
AST SERPL-CCNC: 19 U/L (ref 15–37)
BILIRUB DIRECT SERPL-MCNC: 0.1 MG/DL (ref 0–0.2)
BILIRUB SERPL-MCNC: 0.6 MG/DL (ref 0.1–2)
BILIRUB SERPL-MCNC: 0.6 MG/DL (ref 0.1–2)
BUN BLD-MCNC: 11 MG/DL (ref 7–18)
CALCIUM BLD-MCNC: 9.5 MG/DL (ref 8.5–10.1)
CHLORIDE SERPL-SCNC: 105 MMOL/L (ref 98–112)
CHOLEST SERPL-MCNC: 142 MG/DL (ref ?–200)
CO2 SERPL-SCNC: 31 MMOL/L (ref 21–32)
CREAT BLD-MCNC: 1.07 MG/DL
EGFRCR SERPLBLD CKD-EPI 2021: 89 ML/MIN/1.73M2 (ref 60–?)
FASTING PATIENT LIPID ANSWER: YES
FASTING STATUS PATIENT QL REPORTED: YES
GLOBULIN PLAS-MCNC: 3.3 G/DL (ref 2.8–4.4)
GLUCOSE BLD-MCNC: 111 MG/DL (ref 70–99)
HDLC SERPL-MCNC: 40 MG/DL (ref 40–59)
LDLC SERPL CALC-MCNC: 78 MG/DL (ref ?–100)
NONHDLC SERPL-MCNC: 102 MG/DL (ref ?–130)
OSMOLALITY SERPL CALC.SUM OF ELEC: 286 MOSM/KG (ref 275–295)
POTASSIUM SERPL-SCNC: 4.1 MMOL/L (ref 3.5–5.1)
PROT SERPL-MCNC: 7.2 G/DL (ref 6.4–8.2)
PROT SERPL-MCNC: 7.2 G/DL (ref 6.4–8.2)
SODIUM SERPL-SCNC: 138 MMOL/L (ref 136–145)
TRIGL SERPL-MCNC: 135 MG/DL (ref 30–149)
URATE SERPL-MCNC: 4.2 MG/DL
VLDLC SERPL CALC-MCNC: 21 MG/DL (ref 0–30)

## 2023-08-15 PROCEDURE — 80053 COMPREHEN METABOLIC PANEL: CPT | Performed by: FAMILY MEDICINE

## 2023-08-15 PROCEDURE — 82248 BILIRUBIN DIRECT: CPT | Performed by: FAMILY MEDICINE

## 2023-08-15 PROCEDURE — 84550 ASSAY OF BLOOD/URIC ACID: CPT | Performed by: FAMILY MEDICINE

## 2023-08-15 PROCEDURE — 80061 LIPID PANEL: CPT | Performed by: FAMILY MEDICINE

## 2023-08-16 ENCOUNTER — PATIENT MESSAGE (OUTPATIENT)
Dept: FAMILY MEDICINE CLINIC | Facility: CLINIC | Age: 43
End: 2023-08-16

## 2023-08-16 DIAGNOSIS — I10 ESSENTIAL HYPERTENSION: ICD-10-CM

## 2023-08-16 DIAGNOSIS — M10.9 GOUT OF BOTH KNEES: ICD-10-CM

## 2023-08-16 DIAGNOSIS — E78.00 HIGH CHOLESTEROL: ICD-10-CM

## 2023-08-17 RX ORDER — LISINOPRIL AND HYDROCHLOROTHIAZIDE 20; 12.5 MG/1; MG/1
1 TABLET ORAL DAILY
Qty: 90 TABLET | Refills: 0 | Status: SHIPPED | OUTPATIENT
Start: 2023-08-17

## 2023-08-17 RX ORDER — ATORVASTATIN CALCIUM 20 MG/1
20 TABLET, FILM COATED ORAL DAILY
Qty: 90 TABLET | Refills: 0 | Status: SHIPPED | OUTPATIENT
Start: 2023-08-17

## 2023-08-17 RX ORDER — ALLOPURINOL 300 MG/1
300 TABLET ORAL DAILY
Qty: 90 TABLET | Refills: 0 | Status: SHIPPED | OUTPATIENT
Start: 2023-08-17

## 2023-08-17 NOTE — TELEPHONE ENCOUNTER
Requested Renewals     allopurinol 300 MG Oral Tab         Sig: Take 1 tablet (300 mg total) by mouth daily. Disp: 90 tablet    Refills: 1    Start: 8/16/2023    Class: Normal    Non-formulary For: Gout of both knees    Last ordered: 6 months ago by Chandrakant Vaughn MD         atorvastatin 20 MG Oral Tab         Sig: Take 1 tablet (20 mg total) by mouth daily. Disp: 90 tablet    Refills: 1    Start: 8/16/2023    Class: Normal    Non-formulary For: High cholesterol    Last ordered: 6 months ago by Chandrakant Vaughn MD         lisinopril-hydroCHLOROthiazide 20-12.5 MG Oral Tab         Sig: Take 1 tablet by mouth daily. Disp: 90 tablet    Refills: 1    Start: 8/16/2023    Class: Normal    Non-formulary For: Essential hypertension    Last ordered: 6 months ago by Chandrakant Vaughn MD     Hypertension Medications Protocol [id number]/16/2023 07:54 PM   Protocol Details Appointment in past 6 or next 3 months    CMP or BMP in past 12 months    Last serum creatinine< 2.0             Future Appointments   Date Time Provider Deepthi Aguila   10/2/2023  8:00 AM ROCK Perez [facility]     LOV: 2/6/23 for physical exam  RTC: 6 months    [employer] message sent to patient to schedule an appt. -medication pended for review.

## 2023-08-18 DIAGNOSIS — R73.09 ELEVATED GLUCOSE: Primary | ICD-10-CM

## 2023-08-29 ENCOUNTER — LAB ENCOUNTER (OUTPATIENT)
Dept: LAB | Age: 43
End: 2023-08-29
Attending: FAMILY MEDICINE
Payer: COMMERCIAL

## 2023-08-29 DIAGNOSIS — R73.09 ELEVATED GLUCOSE: ICD-10-CM

## 2023-08-29 LAB
EST. AVERAGE GLUCOSE BLD GHB EST-MCNC: 105 MG/DL (ref 68–126)
HBA1C MFR BLD: 5.3 % (ref ?–5.7)

## 2023-08-29 PROCEDURE — 83036 HEMOGLOBIN GLYCOSYLATED A1C: CPT | Performed by: FAMILY MEDICINE

## 2023-08-29 RX ORDER — LISINOPRIL AND HYDROCHLOROTHIAZIDE 20; 12.5 MG/1; MG/1
1 TABLET ORAL DAILY
Qty: 90 TABLET | Refills: 0 | Status: SHIPPED | OUTPATIENT
Start: 2023-08-29

## 2023-08-29 RX ORDER — ALLOPURINOL 300 MG/1
300 TABLET ORAL DAILY
Qty: 90 TABLET | Refills: 0 | Status: SHIPPED | OUTPATIENT
Start: 2023-08-29

## 2023-08-29 RX ORDER — ATORVASTATIN CALCIUM 20 MG/1
20 TABLET, FILM COATED ORAL DAILY
Qty: 90 TABLET | Refills: 0 | Status: SHIPPED | OUTPATIENT
Start: 2023-08-29

## 2023-08-30 ENCOUNTER — OFFICE VISIT (OUTPATIENT)
Dept: FAMILY MEDICINE CLINIC | Facility: CLINIC | Age: 43
End: 2023-08-30
Payer: COMMERCIAL

## 2023-08-30 VITALS
HEIGHT: 67.5 IN | WEIGHT: 233 LBS | DIASTOLIC BLOOD PRESSURE: 72 MMHG | BODY MASS INDEX: 36.14 KG/M2 | HEART RATE: 80 BPM | OXYGEN SATURATION: 97 % | RESPIRATION RATE: 14 BRPM | SYSTOLIC BLOOD PRESSURE: 102 MMHG

## 2023-08-30 DIAGNOSIS — I10 ESSENTIAL HYPERTENSION: ICD-10-CM

## 2023-08-30 DIAGNOSIS — E66.01 CLASS 3 SEVERE OBESITY DUE TO EXCESS CALORIES WITHOUT SERIOUS COMORBIDITY WITH BODY MASS INDEX (BMI) OF 40.0 TO 44.9 IN ADULT (HCC): ICD-10-CM

## 2023-08-30 DIAGNOSIS — M10.9 GOUT OF BOTH KNEES: ICD-10-CM

## 2023-08-30 DIAGNOSIS — E78.00 HIGH CHOLESTEROL: Primary | ICD-10-CM

## 2023-08-30 PROCEDURE — 99214 OFFICE O/P EST MOD 30 MIN: CPT | Performed by: FAMILY MEDICINE

## 2023-08-30 PROCEDURE — 3078F DIAST BP <80 MM HG: CPT | Performed by: FAMILY MEDICINE

## 2023-08-30 PROCEDURE — 3008F BODY MASS INDEX DOCD: CPT | Performed by: FAMILY MEDICINE

## 2023-08-30 PROCEDURE — 3074F SYST BP LT 130 MM HG: CPT | Performed by: FAMILY MEDICINE

## 2023-10-11 ENCOUNTER — OFFICE VISIT (OUTPATIENT)
Dept: INTERNAL MEDICINE CLINIC | Facility: CLINIC | Age: 43
End: 2023-10-11
Payer: COMMERCIAL

## 2023-10-11 VITALS
WEIGHT: 241.25 LBS | HEIGHT: 67.5 IN | SYSTOLIC BLOOD PRESSURE: 134 MMHG | DIASTOLIC BLOOD PRESSURE: 72 MMHG | HEART RATE: 86 BPM | RESPIRATION RATE: 18 BRPM | BODY MASS INDEX: 37.42 KG/M2 | OXYGEN SATURATION: 96 %

## 2023-10-11 DIAGNOSIS — E66.9 OBESITY, CLASS II, BMI 35-39.9: ICD-10-CM

## 2023-10-11 DIAGNOSIS — I10 ESSENTIAL HYPERTENSION: ICD-10-CM

## 2023-10-11 DIAGNOSIS — E55.9 VITAMIN D DEFICIENCY: ICD-10-CM

## 2023-10-11 DIAGNOSIS — Z51.81 THERAPEUTIC DRUG MONITORING: Primary | ICD-10-CM

## 2023-10-11 DIAGNOSIS — R63.2 BINGE EATING: ICD-10-CM

## 2023-10-11 DIAGNOSIS — R74.8 ELEVATED LIVER ENZYMES: ICD-10-CM

## 2023-10-11 DIAGNOSIS — E78.00 HIGH CHOLESTEROL: ICD-10-CM

## 2023-10-11 PROCEDURE — 3078F DIAST BP <80 MM HG: CPT | Performed by: NURSE PRACTITIONER

## 2023-10-11 PROCEDURE — 3008F BODY MASS INDEX DOCD: CPT | Performed by: NURSE PRACTITIONER

## 2023-10-11 PROCEDURE — 99214 OFFICE O/P EST MOD 30 MIN: CPT | Performed by: NURSE PRACTITIONER

## 2023-10-11 PROCEDURE — 3075F SYST BP GE 130 - 139MM HG: CPT | Performed by: NURSE PRACTITIONER

## 2023-10-11 RX ORDER — LISDEXAMFETAMINE DIMESYLATE CAPSULES 30 MG/1
30 CAPSULE ORAL EVERY MORNING
Qty: 30 CAPSULE | Refills: 0 | Status: SHIPPED | OUTPATIENT
Start: 2023-10-11

## 2023-10-11 NOTE — PATIENT INSTRUCTIONS
Next steps:  1. Fill your prescribed medication and take as discussed and prescribed: vyvanse 30mg   2. Schedule a personal nutrition consultation with one of our registered dieticians  3. Check with insurance on coverage for GLP-1 medications:  (ie. saxenda- daily, wegovy- weekly) or for the diagnosis of prediabetes or diabetes- (ie. Ozempic- weekly, trulicity- weekly, rybelsus- oral/ daily)       Please try to work on the following dietary changes:  Daily protein recommendation to start:  grams  Daily carbohydrate: <150g  Daily calories: 1,800-1,900  1. Drink water with meals and throughout the day, cut down on soda and/or juice if consumed. Consider flavored water options like Bubbly, Spindrift, Hint and Brenda. 2.  Eat breakfast daily and focus on having protein with each meal, examples include: greek yogurt, cottage cheese, hard boiled egg, whole grain toast with peanut butter. 3.  Reduce refined carbohydrates and sugars which includes items such as sweets, as well as rice, pasta, and bread and make sure to choose whole grain options when having them with just 1 serving per meal about the size of your inner palm. 4.  Consume non starchy veggies daily working towards making them a good 50% of your daily food intake. Add them to lunch and dinner consistently. 5.  Start a daily probiotic: VSL#3 is recommended, (order on line at www.vsl3. com). Take 1 capsule daily with water for 30 days, then reduce to 1 every other day (this will reduce the cost). Capsules can be left out for 2 weeks, but then must be refrigerated. Please download luna SocialSmack Fitness Jaye Dancer! Or Net Diary to monitor daily dietary intake and you will be able to see if you are eating the right amount of calories or too much or too little which would hinder weight loss. Additionally this will help to see your daily carbohydrate and protein intake.  When you set the luna up choose 1-2 lbs/week as a goal.  Keeping a paper food journal is an option as well to remain accountable for your choices- this is the start to mindful eating! A low calorie diet has been consistently shown to support weight loss. Continue or start exercising to help establish a routine. If not already exercising begin with 1 day and progress as able with long-term goal of 30 minutes 5 days a week at a minimum. Meditation daily can help manage and control stress. Chronic stress can make weight loss difficult. Exercising is one way to help with stress, but meditation using the CALM Héctor or another comparable alternative can be done in your home or place of work with little time commitment. This Héctor can also help work on behavior change and improve sleep. Check out the segment under Calm Masterclass and listen to The 4 Pillars of Health. A great way to begin learning about the foundation of lifestyle with practical tips to use in your every day. Check out www.yourweightmatters. org blog for continued daily support and education along this weight loss journey! Patient Resources:     Personal Training/Fitness Classes/Health Coaching     Eric Felipe and Lira Sophiaside @ [email or web address]/ Full fitness center with group fitness and personal training. Discount available as client of [facility]. Health Coaching and Personal Training with Angelo Lupe at our [facility]- individual weekly coaching with option to add personal training and small group fitness classes targeted at weight loss- [phone] and/or email @ [email or web address]  360FIT [place] [email or web address] Group Fitness [phone] and/or email Damaso Coyle at [email or web address]  [address] with multiple locations: Aetna (www.Constellation Research. Adsame), Eat The Frog Fitness (www.BehavioSec. Adsame), Fit Body Bootcamp (www.LiveSafep.Adsame), Formarum (www.Wise Connect. com), The Exercise  (www.exercisecoach.GoNogging)     Online Fitness  Fitness  on [employer] in 10 DVD series- www. jvpmw96WIL. GoNogging  Sit and Be Fit - Chair exercise series Www.sitandbefit. org  Hip Hop Fit with Ashwin Lawrence at www.hiphopfit. net     Apps for on the [place] [employer] 7 Minute Workout (orange box with white 7) - free on the go HIIT training héctor  Peloton Héctor @ Branch     Nutrition Trackers and Tools  LoseIT! And My Fitness Pal apps and on line for tracking nutrition  NOOM - virtual health coaching  FitFoundation (healthy meals on the go) in [employer] @ www. tndonjokqcygl7a. Art Juanito NORWOOD @ wwwDueProps and Vesna Dominguez (keto and low carb plans recommended) @ Booktrope.YOQ, Metabolic Meals @ www. MyMetabolicMeals. com - individual prepared meals to go  Social Media Simplified, Teal Orbit, International [employer] Machines, Every Plate, NaiKun Wind Development- on line meal delivery programs for preparation at home  [place] [employer] in [place] for homemade meals to go @ wwwFlowgear  Diet Doctor @ www. dietdoctor. GoNogging - low carb swaps  YuTraxer - meal prep and planning héctor (www.yummly. com)     Stress Management/Behavior/Mindful Eating  CALM meditation héctor (www.US Grand Prix Championship)  Headspace  Am I Hungry? Mindful eating virtual  héctor  Www.yourweightmatters. org - Obesity Action Coalition sponsored Blog posts daily  Motivation héctor (black box with white \")- daily supportive messages sent to your phone     Books/Video Education/Podcasts  Mindless Eating by Linda Figueroa  Why We Get Sick by Shruthi Zamora (a book about insulin resistance)  Atomic Habits by Uyen Constantino (a book about taking small steps to promote greater behavior change)   Can't Hurt Me by Aloma Carrel (a book exploring the power of discipline in achieving your goals)  The End of Dieting: How to Live for Life by Dr. Huey Luis M.D. or listen to The [facility] Episode 61: Understanding \"Nutritarian\" Eating w/Dr. Arzate Cisco  Your Body in Balance:  The New Science of Food, Hormones, and Health by Dr. Neha Romano  The Menopause Diet Plan by Kyung Singleton and [facility] - A H[facility] AT [facility]  The Complete Guide to fasting by Dr. Pop Braxton, [facility] by Rene Rios, Ph.D, R.D. Weight Loss Surgery Will Not Treat Food Addiction by Spencer Diaz Ph.D  The [address] [place] [address] on plant based nutrition  Fed Up - documentary about obesity (Free on [place])  The Truth About Sugar - documentary on sugar (Free on Lidyana.com, https://youtu. be/6S3bzknTE8r)  The Dr. Lucretia Salgado by Dr. Sara Vasquez MD  Fitlosophy Fitspiration - journal to better health (found at [employer] in fitness aisle)  What Happened to You?- a look at the impact trauma has on behavior written by Lilly Gu and Dr. Sunshine Vicente Again by Vincent Renee - discovering your true self after trauma  Aubrie Ortiz talk on CaroGen, The Call to Courage  Podcasts: The Exam Room by the Physician's Committee, Nutrition Facts by Dr. Joseph Champion    We are here to support you with weight loss, but please remember that you still need your primary care provider for your routine health maintenance.

## 2023-11-09 DIAGNOSIS — R63.2 BINGE EATING: ICD-10-CM

## 2023-11-09 RX ORDER — LISDEXAMFETAMINE DIMESYLATE CAPSULES 30 MG/1
30 CAPSULE ORAL EVERY MORNING
Qty: 30 CAPSULE | Refills: 0 | Status: CANCELLED | OUTPATIENT
Start: 2023-11-09

## 2023-11-10 RX ORDER — LISDEXAMFETAMINE DIMESYLATE CAPSULES 30 MG/1
30 CAPSULE ORAL DAILY
Qty: 30 CAPSULE | Refills: 0 | Status: SHIPPED | OUTPATIENT
Start: 2024-01-11 | End: 2024-02-10

## 2023-11-10 RX ORDER — LISDEXAMFETAMINE DIMESYLATE CAPSULES 30 MG/1
30 CAPSULE ORAL DAILY
Qty: 30 CAPSULE | Refills: 0 | Status: SHIPPED | OUTPATIENT
Start: 2023-11-10 | End: 2023-12-10

## 2023-11-10 RX ORDER — LISDEXAMFETAMINE DIMESYLATE CAPSULES 30 MG/1
30 CAPSULE ORAL DAILY
Qty: 30 CAPSULE | Refills: 0 | Status: SHIPPED | OUTPATIENT
Start: 2023-12-11 | End: 2024-01-10

## 2023-11-10 NOTE — TELEPHONE ENCOUNTER
Requesting   Requested Prescriptions     Pending Prescriptions Disp Refills    lisdexamfetamine (VYVANSE) 30 MG Oral Cap 30 capsule 0     Sig: Take 1 capsule (30 mg total) by mouth every morning.        LOV: 10/11/23  RTC: 3 months  Last Relevant Labs:   Filled: 10/11/23 #30 with 0 refills    Future Appointments   Date Time Provider Deepthi Aguila   1/8/2024 12:40 PM ROCK Perez [address]   11/4/2024  8:00 AM ROCK Garza [facility]

## 2023-11-16 ENCOUNTER — TELEPHONE (OUTPATIENT)
Dept: FAMILY MEDICINE CLINIC | Facility: CLINIC | Age: 43
End: 2023-11-16

## 2023-11-16 NOTE — TELEPHONE ENCOUNTER
Medication Dispense History (from 5/20/2023 to 11/16/2023)  Expand All  Collapse All  Allopurinol     Dispensed Written Strength Quantity Refills Days Supply Provider Pharmacy   ALLOPURINOL 300 MG TABS 11/16/2023 11/16/2023 300 mg 90 tablet  90 Amos Licea MD [id number] [place]. ..

## 2023-11-16 NOTE — TELEPHONE ENCOUNTER
Calling because they recd the prescription for the Allopurinol 300 mg for 90 day supply but there were no directions for the medication. We had recd fax from [facility] that provider signed. Was faxed on 11-16-23. Copy sent to scan and copy placed in blue accordion folder at .

## 2024-01-08 ENCOUNTER — OFFICE VISIT (OUTPATIENT)
Dept: INTERNAL MEDICINE CLINIC | Facility: CLINIC | Age: 44
End: 2024-01-08
Payer: COMMERCIAL

## 2024-01-08 VITALS
DIASTOLIC BLOOD PRESSURE: 82 MMHG | SYSTOLIC BLOOD PRESSURE: 128 MMHG | HEIGHT: 67.5 IN | WEIGHT: 256 LBS | RESPIRATION RATE: 16 BRPM | HEART RATE: 78 BPM | BODY MASS INDEX: 39.71 KG/M2

## 2024-01-08 DIAGNOSIS — G47.33 OSA ON CPAP: ICD-10-CM

## 2024-01-08 DIAGNOSIS — E66.9 OBESITY, CLASS II, BMI 35-39.9: ICD-10-CM

## 2024-01-08 DIAGNOSIS — Z51.81 THERAPEUTIC DRUG MONITORING: Primary | ICD-10-CM

## 2024-01-08 DIAGNOSIS — E55.9 VITAMIN D DEFICIENCY: ICD-10-CM

## 2024-01-08 DIAGNOSIS — R63.2 BINGE EATING: ICD-10-CM

## 2024-01-08 DIAGNOSIS — R74.8 ELEVATED LIVER ENZYMES: ICD-10-CM

## 2024-01-08 DIAGNOSIS — I10 ESSENTIAL HYPERTENSION: ICD-10-CM

## 2024-01-08 DIAGNOSIS — R73.03 PREDIABETES: ICD-10-CM

## 2024-01-08 DIAGNOSIS — E78.5 DYSLIPIDEMIA: ICD-10-CM

## 2024-01-08 PROCEDURE — 3079F DIAST BP 80-89 MM HG: CPT | Performed by: NURSE PRACTITIONER

## 2024-01-08 PROCEDURE — 99213 OFFICE O/P EST LOW 20 MIN: CPT | Performed by: NURSE PRACTITIONER

## 2024-01-08 PROCEDURE — 3074F SYST BP LT 130 MM HG: CPT | Performed by: NURSE PRACTITIONER

## 2024-01-08 PROCEDURE — 3008F BODY MASS INDEX DOCD: CPT | Performed by: NURSE PRACTITIONER

## 2024-01-08 NOTE — PATIENT INSTRUCTIONS
Next steps:  1.  Fill your prescribed medication and take as discussed and prescribed: vyvanse 30mg  Check to see if zepbound would be covered by insurance   2.  Schedule a personal nutrition consultation with one of our registered dieticians     Please try to work on the following dietary changes:  Daily protein recommendation to start:  grams  Daily carbohydrate: <150g  Daily calories: 1,900-2,000  1.  Drink water with meals and throughout the day, cut down on soda and/or juice if consumed. Consider flavored water options like Bubbly, Spindrift, Hint and Brenda.  2.  Eat breakfast daily and focus on having protein with each meal, examples include: greek yogurt, cottage cheese, hard boiled egg, whole grain toast with peanut butter.   3.  Reduce refined carbohydrates and sugars which includes items such as sweets, as well as rice, pasta, and bread and make sure to choose whole grain options when having them with just 1 serving per meal about the size of your inner palm.  4.  Consume non starchy veggies daily working towards making them a good 50% of your daily food intake. Add them to lunch and dinner consistently.  5.  Start a daily probiotic: VSL#3 is recommended, (order on line at www.vsl3.com). Take 1 capsule daily with water for 30 days, then reduce to 1 every other day (this will reduce the cost). Capsules can be left out for 2 weeks, but then must be refrigerated.      Please download luna My Fitness Pal, LoseIt! Or Net Diary to monitor daily dietary intake and you will be able to see if you are eating the right amount of calories or too much or too little which would hinder weight loss. Additionally this will help to see your daily carbohydrate and protein intake. When you set the luna up choose 1-2 lbs/week as a goal.  Keeping a paper food journal is an option as well to remain accountable for your choices- this is the start to mindful eating! A low calorie diet has been consistently shown to support  weight loss.     Continue or start exercising to help establish a routine. If not already exercising begin with 1 day and progress as able with long-term goal of 30 minutes 5 days a week at a minimum.     Meditation daily can help manage and control stress. Chronic stress can make weight loss difficult.  Exercising is one way to help with stress, but meditation using the CALM Héctor or another comparable alternative can be done in your home or place of work with little time commitment. This Héctor can also help work on behavior change and improve sleep. Check out the segment under Calm Masterclass and listen to The 4 Pillars of Health. A great way to begin learning about the foundation of lifestyle with practical tips to use in your every day.     Check out www.yourweightmatters.org blog for continued daily support and education along this weight loss journey!    Patient Resources:     Personal Training/Fitness Classes/Health Coaching     [facility] @ [email or web address]/healthy-driven/fitness-center Full fitness center with group fitness and personal training. Discount available as client of [facility].  Health Coaching and Personal Training with Dacia Wallis at our [facility]- individual weekly coaching with option to add personal training and small group fitness classes targeted at weight loss- [phone] and/or email @ [email or web address]  360FIT [place] http://www.Intelligent Mobile Support. Group Fitness [phone] and/or email Anette at [email or web address] Mobile Support  [facility] Hospital[facility] Fitness Centers with multiple locations: Rapid Vocabulary (www.Mashed Pixel), Eat The Frog Fitness (www.C[place] BioLabs.Xola), Fit Body Bootcamp (www.Incisive SurgicalbodybootTelecardiap.com), Aptible Fitness (www.Channelkit.Xola), The Exercise  (www.exercisecoach.Xola)     Online Fitness  Fitness  on Utube  Fit in 10 DVD series- www.onjmg68ROS404 Found!  Sit and Be Fit  - Chair exercise series Www.sitandbefit.org  Hip Hop Fit with Ashwin Lawrence at www.hiphopfit.net     Apps for on the Go Fitness  [place] 7 Minute Workout (orange box with white 7) - free on the go HIIT training héctor  Peloton Héctor @ www.onepeloton.com     Nutrition Trackers and Tools  LoseIT! And My Fitness Pal apps and on line for tracking nutrition  NOOM - virtual health coaching  FitFoundation (healthy meals on the go) in [place] @ www.lhvwzqvhvgjcn5nCorrelec  Justyn NORWOOD @ wwwCarefxbistromd.com and Zekumi85 (keto and low carb plans recommended) @ www.fchlgt95.com, Metabolic Meals @ www.Desi HitsMetabolicMeals.ArtSquare - individual prepared meals to go  Gobble, Blue Apron, Home , Every Plate, Sunbasket- on line meal delivery programs for preparation at home  [place] in [place] for homemade meals to go @ www.meal"Houdini[employer] Inc."llage.ArtSquare  Diet Doctor @ www.dietdoctor.ArtSquare - low carb swaps  Yummly - meal prep and planning héctor (www.yummly.com)     Stress Management/Behavior/Mindful Eating  CALM meditation héctor (www.calm.com)  Headspace  Am I Hungry? Mindful eating virtual  héctor  Www.yourweightmatters.org - Obesity Action Coalition sponsored Blog posts daily  Motivation héctor (black box with white \")- daily supportive messages sent to your phone     Books/Video Education/Podcasts  Mindless Eating by Misael Hylton  Why We Get Sick by Davi Russell (a book about insulin resistance)  Atomic Habits by Salvador Neumann (a book about taking small steps to promote greater behavior change)   Can't Hurt Me by Rick Seals (a book exploring the power of discipline in achieving your goals)  The End of Dieting: How to Live for Life by Dr. Jerson Keenan M.D. or listen to The [employer] Podcast Episode 63: Understanding \"Nutritarian\" Eating w/Dr. Jerson Keenan  Your Body in Balance: The New Science of Food, Hormones, and Health by Dr. Scottie Schafer  The Menopause Diet Plan by Court Smiley and Lenore Wagner  The Complete Guide to fasting by Dr. Kely Russo,  Salt & Fat by Leann Galeana, Ph.D, R.D.  Weight Loss Surgery Will Not Treat Food Addiction by Jocelynn Beltran Ph.D  The Game Changers- Vilant Systemsix Documentary on plant based nutrition  Fed Up - documentary about obesity (Free on Utube)  The Truth About Sugar - documentary on sugar (Free on Utube, https://youtu.be/7Y4majgVU6d)  The Dr. Florian T5 Wellness Plan by Dr. Casper Florian MD  Fitlosophy Fitspiration - journal to better health (found at [employer] in fitness aisle)  What Happened to You?- a look at the impact trauma has on behavior written by Devan Willett and Dr. Kaiden Barajas  Whole Again by Ray Worthy - discovering your true self after trauma  Keisha Ernst talk on Ask The Doctor, The Call to Courage  Podcasts: The Exam Room by the Physician's Committee, Nutrition Facts by Dr. Gonzalez    We are here to support you with weight loss, but please remember that you still need your primary care provider for your routine health maintenance.

## 2024-02-09 ENCOUNTER — E-VISIT (OUTPATIENT)
Dept: TELEHEALTH | Age: 44
End: 2024-02-09
Payer: COMMERCIAL

## 2024-02-09 DIAGNOSIS — U07.1 POSITIVE SELF-ADMINISTERED ANTIGEN TEST FOR COVID-19: Primary | ICD-10-CM

## 2024-02-09 PROCEDURE — 99421 OL DIG E/M SVC 5-10 MIN: CPT | Performed by: NURSE PRACTITIONER

## 2024-02-09 NOTE — PROGRESS NOTES
Khanh Honeycutt is a 43 year old male.  HPI:   See answers to questions above. + covid. Questions regarding paxlovid    Current Outpatient Medications   Medication Sig Dispense Refill    lisdexamfetamine (VYVANSE) 30 MG Oral Cap Take 1 capsule (30 mg total) by mouth daily. 30 capsule 0    allopurinol 300 MG Oral Tab Take 1 tablet (300 mg total) by mouth daily. 90 tablet 0    atorvastatin 20 MG Oral Tab Take 1 tablet (20 mg total) by mouth daily. 90 tablet 0    lisinopril-hydroCHLOROthiazide 20-12.5 MG Oral Tab Take 1 tablet by mouth daily. 90 tablet 0      Past Medical History:   Diagnosis Date    Essential hypertension     Hemorrhoids July 2023    One time occurrence. Painful bowel movement w/ bleeding    High cholesterol 2015    Hyperlipidemia     Leg swelling 2007    Sleep apnea     Wears glasses 2004      Past Surgical History:   Procedure Laterality Date    WISDOM TEETH REMOVED        Family History   Problem Relation Age of Onset    Diabetes Paternal Grandmother     Stroke Mother     Colon Cancer Neg     Breast Cancer Neg     Ovarian Cancer Neg       Social History:  Social History     Socioeconomic History    Marital status: Single   Tobacco Use    Smoking status: Never    Smokeless tobacco: Never    Tobacco comments:     Lived w/ smoker while growing up, worked in [employer]. No regular exposure since 2020   Vaping Use    Vaping Use: Never used   Substance and Sexual Activity    Alcohol use: Yes     Alcohol/week: 1.0 standard drink of alcohol     Types: 1 Standard drinks or equivalent per week     Comment: 1-2 drinks at social events (approx. monthly)    Drug use: Never         ASSESSMENT AND PLAN:   Total time 6 minutes

## 2024-02-10 DIAGNOSIS — M10.9 GOUT OF BOTH KNEES: ICD-10-CM

## 2024-02-10 DIAGNOSIS — I10 ESSENTIAL HYPERTENSION: ICD-10-CM

## 2024-02-10 DIAGNOSIS — E78.00 HIGH CHOLESTEROL: ICD-10-CM

## 2024-02-13 DIAGNOSIS — M10.9 GOUT OF BOTH KNEES: ICD-10-CM

## 2024-02-13 DIAGNOSIS — I10 ESSENTIAL HYPERTENSION: ICD-10-CM

## 2024-02-13 DIAGNOSIS — E78.00 HIGH CHOLESTEROL: ICD-10-CM

## 2024-02-14 RX ORDER — ALLOPURINOL 300 MG/1
300 TABLET ORAL DAILY
Qty: 90 TABLET | Refills: 3 | OUTPATIENT
Start: 2024-02-14

## 2024-02-14 RX ORDER — LISINOPRIL AND HYDROCHLOROTHIAZIDE 20; 12.5 MG/1; MG/1
1 TABLET ORAL DAILY
Qty: 90 TABLET | Refills: 3 | OUTPATIENT
Start: 2024-02-14

## 2024-02-14 RX ORDER — ALLOPURINOL 300 MG/1
300 TABLET ORAL DAILY
Qty: 90 TABLET | Refills: 0 | Status: SHIPPED | OUTPATIENT
Start: 2024-02-14

## 2024-02-14 RX ORDER — LISINOPRIL AND HYDROCHLOROTHIAZIDE 20; 12.5 MG/1; MG/1
1 TABLET ORAL DAILY
Qty: 90 TABLET | Refills: 0 | Status: SHIPPED | OUTPATIENT
Start: 2024-02-14

## 2024-02-14 RX ORDER — ATORVASTATIN CALCIUM 20 MG/1
20 TABLET, FILM COATED ORAL DAILY
Qty: 90 TABLET | Refills: 3 | OUTPATIENT
Start: 2024-02-14

## 2024-02-14 RX ORDER — ATORVASTATIN CALCIUM 20 MG/1
20 TABLET, FILM COATED ORAL DAILY
Qty: 90 TABLET | Refills: 0 | Status: SHIPPED | OUTPATIENT
Start: 2024-02-14

## 2024-02-14 NOTE — TELEPHONE ENCOUNTER
allopurinol 300 MG Oral Tab          Possible duplicate: Lucianver to review recent actions on this medication    Sig: Take 1 tablet (300 mg total) by mouth daily.    Disp: 90 tablet    Refills: 0    Start: 2/10/2024    Class: Normal    Non-formulary For: Gout of both knees    Last ordered: 5 months ago (8/29/2023) by West Torres MD        atorvastatin 20 MG Oral Tab          Possible duplicate: Steffen to review recent actions on this medication    Sig: Take 1 tablet (20 mg total) by mouth daily.    Disp: 90 tablet    Refills: 0    Start: 2/10/2024    Class: Normal    Non-formulary For: High cholesterol    Last ordered: 5 months ago (8/29/2023) by West Torres MD    Cholesterol Medication Protocol Wydhje44/10/2024 02:17 AM   Protocol Details ALT < 80    ALT resulted within past year    Lipid panel within past 12 months    In person appointment or virtual visit in the past 12 mos or appointment in next 3 mos       lisinopril-hydroCHLOROthiazide 20-12.5 MG Oral Tab          Possible duplicate: Steffen to review recent actions on this medication    Sig: Take 1 tablet by mouth daily.    Disp: 90 tablet    Refills: 0    Start: 2/10/2024    Class: Normal    Non-formulary For: Essential hypertension    Last ordered: 5 months ago (8/29/2023) by West Torres MD    Hypertension Medications Protocol Iafebu47/10/2024 02:17 AM   Protocol Details CMP or BMP in past 12 months    Last BP reading less than 140/90    In person appointment or virtual visit in the past 12 mos or appointment in next 3 mos    EGFRCR or GFRNAA > 50      To be filled at: [facility] [address] [phone], [phone]

## 2024-02-20 ENCOUNTER — LAB ENCOUNTER (OUTPATIENT)
Dept: LAB | Age: 44
End: 2024-02-20
Attending: NURSE PRACTITIONER
Payer: COMMERCIAL

## 2024-02-20 DIAGNOSIS — R74.01 ELEVATED ALT MEASUREMENT: ICD-10-CM

## 2024-02-20 LAB
ALBUMIN SERPL-MCNC: 4.4 G/DL (ref 3.4–5)
ALP LIVER SERPL-CCNC: 71 U/L
ALT SERPL-CCNC: 67 U/L
AST SERPL-CCNC: 31 U/L (ref 15–37)
BILIRUB DIRECT SERPL-MCNC: 0.1 MG/DL (ref 0–0.2)
BILIRUB SERPL-MCNC: 0.4 MG/DL (ref 0.1–2)
PROT SERPL-MCNC: 7.5 G/DL (ref 6.4–8.2)

## 2024-02-20 PROCEDURE — 36415 COLL VENOUS BLD VENIPUNCTURE: CPT

## 2024-02-20 PROCEDURE — 80076 HEPATIC FUNCTION PANEL: CPT

## 2024-02-21 ENCOUNTER — OFFICE VISIT (OUTPATIENT)
Dept: FAMILY MEDICINE CLINIC | Facility: CLINIC | Age: 44
End: 2024-02-21
Payer: COMMERCIAL

## 2024-02-21 VITALS
BODY MASS INDEX: 40.18 KG/M2 | WEIGHT: 259 LBS | HEART RATE: 80 BPM | RESPIRATION RATE: 14 BRPM | OXYGEN SATURATION: 79 % | DIASTOLIC BLOOD PRESSURE: 88 MMHG | TEMPERATURE: 98 F | HEIGHT: 67.5 IN | SYSTOLIC BLOOD PRESSURE: 112 MMHG

## 2024-02-21 DIAGNOSIS — E55.9 VITAMIN D DEFICIENCY: ICD-10-CM

## 2024-02-21 DIAGNOSIS — E66.01 CLASS 3 SEVERE OBESITY DUE TO EXCESS CALORIES WITHOUT SERIOUS COMORBIDITY WITH BODY MASS INDEX (BMI) OF 40.0 TO 44.9 IN ADULT (HCC): ICD-10-CM

## 2024-02-21 DIAGNOSIS — Z00.00 ROUTINE ADULT HEALTH MAINTENANCE: Primary | ICD-10-CM

## 2024-02-21 PROCEDURE — 3079F DIAST BP 80-89 MM HG: CPT | Performed by: FAMILY MEDICINE

## 2024-02-21 PROCEDURE — 99396 PREV VISIT EST AGE 40-64: CPT | Performed by: FAMILY MEDICINE

## 2024-02-21 PROCEDURE — 3008F BODY MASS INDEX DOCD: CPT | Performed by: FAMILY MEDICINE

## 2024-02-21 PROCEDURE — 3074F SYST BP LT 130 MM HG: CPT | Performed by: FAMILY MEDICINE

## 2024-02-21 NOTE — PROGRESS NOTES
Khanh Honeycutt is a 43 year old male who presents for a complete physical exam.   HPI:   Pt complains of nothing.  Urination changes no  ED symptoms no  Immunizations needed no  Wt Readings from Last 6 Encounters:   02/21/24 259 lb (117.5 kg)   01/08/24 256 lb (116.1 kg)   11/02/23 243 lb 3.2 oz (110.3 kg)   10/11/23 241 lb 4 oz (109.4 kg)   08/30/23 233 lb (105.7 kg)   07/05/23 246 lb (111.6 kg)     Body mass index is 39.97 kg/m².     Results for orders placed or performed in visit on 02/20/24   Liver Function Panel (7)   Result Value Ref Range    AST 31 15 - 37 U/L    ALT 67 (H) 16 - 61 U/L    Alkaline Phosphatase 71 45 - 117 U/L    Bilirubin, Total 0.4 0.1 - 2.0 mg/dL    Bilirubin, Direct 0.1 0.0 - 0.2 mg/dL    Total Protein 7.5 6.4 - 8.2 g/dL    Albumin 4.4 3.4 - 5.0 g/dL       Current Outpatient Medications   Medication Sig Dispense Refill    allopurinol 300 MG Oral Tab Take 1 tablet (300 mg total) by mouth daily. 90 tablet 0    atorvastatin 20 MG Oral Tab Take 1 tablet (20 mg total) by mouth daily. 90 tablet 0    lisinopril-hydroCHLOROthiazide 20-12.5 MG Oral Tab Take 1 tablet by mouth daily. 90 tablet 0      Past Medical History:   Diagnosis Date    Essential hypertension     Hemorrhoids July 2023    One time occurrence. Painful bowel movement w/ bleeding    High cholesterol 2015    Hyperlipidemia     Leg swelling 2007    Sleep apnea     Wears glasses 2004      Past Surgical History:   Procedure Laterality Date    WISDOM TEETH REMOVED        Family History   Problem Relation Age of Onset    Diabetes Paternal Grandmother     Stroke Mother     Colon Cancer Neg     Breast Cancer Neg     Ovarian Cancer Neg       Social History:  Social History     Socioeconomic History    Marital status: Single   Tobacco Use    Smoking status: Never    Smokeless tobacco: Never    Tobacco comments:     Lived w/ smoker while growing up, worked in [email or web address]. No regular exposure since 2020   Vaping Use    Vaping Use: Never  used   Substance and Sexual Activity    Alcohol use: Yes     Alcohol/week: 1.0 standard drink of alcohol     Types: 1 Standard drinks or equivalent per week     Comment: 1-2 drinks at social events (approx. monthly)    Drug use: Never         REVIEW OF SYSTEMS:   GENERAL: feels well overall, denies fever or chills, denies change in weight  SKIN: denies any unusual skin lesions  EYES: denies changes in vision  HENT: denies upper respiratory symptoms  LUNGS: denies ROSEMARY, wheezing, cough, orthopnea and PND  CARDIOVASCULAR: denies CP, palpitations, rapid or slow heart rate. Denies MANZO/lower extremity swelling  GI: denies abdominal pain,denies heartburn, denies n/v/c/d/change in stools/blood in stool/black stool/change in appetite  : denies nocturia or changes in urinary stream, denies scrotal mass/abnormal discharge from urethra  MUSCULOSKELETAL: denies joint or muscle aches or pains  NEURO: denies headaches/dizziness  PSYCH: denies depression or anxiety  HEMATOLOGIC: denies easy bruising/bleeding/anemia/blood clot disorders  ENDOCRINE: denies weight gain/weight loss/enlargement of neck glands/polyuria/polydypsia  ALL/ASTHMA: denies allergic rhinitis/asthma    EXAM:   /88   Pulse 80   Temp 97.5 °F (36.4 °C) (Temporal)   Resp 14   Ht 5' 7.5\" (1.715 m)   Wt 259 lb (117.5 kg)   SpO2 (!) 79%   BMI 39.97 kg/m²   Body mass index is 39.97 kg/m².   GENERAL: NAD, pleasant, well developed, normal voice  SKIN: no rashes, no suspicious moles or lesions  HENT: NCAT, EACs clear b/l, TMs normal b/l, nasal turbinates normal b/l, oropharynx with mmm/clear/normal, gingiva normal, lips normal  EYES: PERRLA, EOMI, conjunctiva non-injected and non-icteric  NECK: supple, no adenopathy/thyromegaly/thyroid nodules/masses  CHEST: no chest tenderness  LUNGS: CTA A/P, no wheezes/ronchi/rales/crackles, normal air excursion  CARDIOVASCULAR: RRR, no murmur, no lower extremity edema, pedal and femoral pulses 2+ and symmetric b/l  GI:  normoactive BS, non-distended, non-tender to palpation, no HSM/masses/pulsations  MUSCULOSKELETAL: Back with normal AROM, no joint swelling, extremities x 4 with normal strength 5/5 and symmetric and with normal AROM/PROM.   EXTREMITIES: no cyanosis, clubbing or edema  NEURO: A&O x3, CN II-XII grossly intact. Reflexes: biceps and patellar 2+ b/l and symmetric. Gait is normal.  PSYCH: normal affect, no apparent thought disorder, average judgement and insight    Immunization History   Administered Date(s) Administered    Covid-19 Vaccine Moderna 100 mcg/0.5 ml 03/24/2021, 04/21/2021, 11/08/2021    Covid-19 Vaccine Moderna Bivalent 50mcg/0.5mL 12+ years 09/11/2022    FLUZONE 6 months and older PFS 0.5 ml (82064) 11/08/2021    Hep A, Adult 09/27/2023    Influenza 09/28/2020, 09/11/2022, 09/24/2023    Moderna Covid-19 Vaccine 50mcg/0.5ml 12yrs+ (8925-1437) 09/25/2023    TDAP 02/06/2023       ASSESSMENT AND PLAN:   Khanh Honeycutt is a 43 year old male who presents for a complete physical exam.     Khanh was seen today for physical.    Diagnoses and all orders for this visit:    Routine adult health maintenance    Patient has history of low vitamin D and last checked in October 2022.  Will check routine blood work and vitamin D level.  He has gained weight at least 20 pounds over the past 9 months.  Good diet and exercise encouraged he does have my diet sheet.  Previously while on Ozempic.    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    He did have some blood work including liver function test and CBC done yesterday by his GI doctor.    Pt educated on immunizations and health maintenance appropriate for age.     The patient verbalizes understanding of these recommendations and agrees to the plan.    The patient is asked to return for complete physical yearly.    There are no Patient Instructions on file for this visit.  No orders of the defined types were placed in this encounter.

## 2024-02-22 ENCOUNTER — LAB ENCOUNTER (OUTPATIENT)
Dept: LAB | Age: 44
End: 2024-02-22
Attending: FAMILY MEDICINE
Payer: COMMERCIAL

## 2024-02-22 ENCOUNTER — PATIENT MESSAGE (OUTPATIENT)
Dept: FAMILY MEDICINE CLINIC | Facility: CLINIC | Age: 44
End: 2024-02-22

## 2024-02-22 DIAGNOSIS — E66.01 CLASS 3 SEVERE OBESITY DUE TO EXCESS CALORIES WITHOUT SERIOUS COMORBIDITY WITH BODY MASS INDEX (BMI) OF 40.0 TO 44.9 IN ADULT (HCC): ICD-10-CM

## 2024-02-22 DIAGNOSIS — E55.9 VITAMIN D DEFICIENCY: ICD-10-CM

## 2024-02-22 DIAGNOSIS — Z00.00 ROUTINE ADULT HEALTH MAINTENANCE: ICD-10-CM

## 2024-02-22 LAB
ANION GAP SERPL CALC-SCNC: 5 MMOL/L (ref 0–18)
BUN BLD-MCNC: 13 MG/DL (ref 9–23)
CALCIUM BLD-MCNC: 9.8 MG/DL (ref 8.5–10.1)
CHLORIDE SERPL-SCNC: 109 MMOL/L (ref 98–112)
CHOLEST SERPL-MCNC: 177 MG/DL (ref ?–200)
CO2 SERPL-SCNC: 27 MMOL/L (ref 21–32)
CREAT BLD-MCNC: 1.11 MG/DL
EGFRCR SERPLBLD CKD-EPI 2021: 85 ML/MIN/1.73M2 (ref 60–?)
EST. AVERAGE GLUCOSE BLD GHB EST-MCNC: 123 MG/DL (ref 68–126)
FASTING PATIENT LIPID ANSWER: YES
FASTING STATUS PATIENT QL REPORTED: YES
GLUCOSE BLD-MCNC: 118 MG/DL (ref 70–99)
HBA1C MFR BLD: 5.9 % (ref ?–5.7)
HDLC SERPL-MCNC: 44 MG/DL (ref 40–59)
LDLC SERPL CALC-MCNC: 96 MG/DL (ref ?–100)
NONHDLC SERPL-MCNC: 133 MG/DL (ref ?–130)
OSMOLALITY SERPL CALC.SUM OF ELEC: 293 MOSM/KG (ref 275–295)
POTASSIUM SERPL-SCNC: 3.7 MMOL/L (ref 3.5–5.1)
SODIUM SERPL-SCNC: 141 MMOL/L (ref 136–145)
T4 FREE SERPL-MCNC: 0.9 NG/DL (ref 0.8–1.7)
TRIGL SERPL-MCNC: 219 MG/DL (ref 30–149)
TSI SER-ACNC: 1.56 MIU/ML (ref 0.36–3.74)
VIT D+METAB SERPL-MCNC: 41.3 NG/ML (ref 30–100)
VLDLC SERPL CALC-MCNC: 36 MG/DL (ref 0–30)

## 2024-02-22 PROCEDURE — 36415 COLL VENOUS BLD VENIPUNCTURE: CPT

## 2024-02-22 PROCEDURE — 84439 ASSAY OF FREE THYROXINE: CPT

## 2024-02-22 PROCEDURE — 82306 VITAMIN D 25 HYDROXY: CPT

## 2024-02-22 PROCEDURE — 83036 HEMOGLOBIN GLYCOSYLATED A1C: CPT

## 2024-02-22 PROCEDURE — 80048 BASIC METABOLIC PNL TOTAL CA: CPT

## 2024-02-22 PROCEDURE — 84443 ASSAY THYROID STIM HORMONE: CPT

## 2024-02-22 PROCEDURE — 80061 LIPID PANEL: CPT

## 2024-02-23 NOTE — TELEPHONE ENCOUNTER
From: Khanh Honeycutt  To: [place] Torres  Sent: 2/22/2024 5:00 PM CST  Subject: Blood panel - no A1C shown    Hello, it looks like my test results were a bit higher which is in line with my weight loss reversal. I look forward to hearing your thoughts if any different from what we discussed in office.    I thought we were getting an A1C check, and it's not here--possible to test from the same sample?

## 2024-04-23 ENCOUNTER — OFFICE VISIT (OUTPATIENT)
Dept: INTERNAL MEDICINE CLINIC | Facility: CLINIC | Age: 44
End: 2024-04-23
Payer: COMMERCIAL

## 2024-04-23 VITALS
BODY MASS INDEX: 41.57 KG/M2 | RESPIRATION RATE: 16 BRPM | HEIGHT: 67.5 IN | DIASTOLIC BLOOD PRESSURE: 80 MMHG | WEIGHT: 268 LBS | HEART RATE: 84 BPM | SYSTOLIC BLOOD PRESSURE: 128 MMHG

## 2024-04-23 DIAGNOSIS — Z51.81 THERAPEUTIC DRUG MONITORING: Primary | ICD-10-CM

## 2024-04-23 DIAGNOSIS — I10 ESSENTIAL HYPERTENSION: ICD-10-CM

## 2024-04-23 DIAGNOSIS — R73.03 PREDIABETES: ICD-10-CM

## 2024-04-23 DIAGNOSIS — E55.9 VITAMIN D DEFICIENCY: ICD-10-CM

## 2024-04-23 DIAGNOSIS — G47.33 OSA ON CPAP: ICD-10-CM

## 2024-04-23 DIAGNOSIS — E66.9 OBESITY, CLASS II, BMI 35-39.9: ICD-10-CM

## 2024-04-23 DIAGNOSIS — E78.5 DYSLIPIDEMIA: ICD-10-CM

## 2024-04-23 DIAGNOSIS — R74.8 ELEVATED LIVER ENZYMES: ICD-10-CM

## 2024-04-23 DIAGNOSIS — R63.2 BINGE EATING: ICD-10-CM

## 2024-04-23 PROCEDURE — 3008F BODY MASS INDEX DOCD: CPT | Performed by: NURSE PRACTITIONER

## 2024-04-23 PROCEDURE — 3079F DIAST BP 80-89 MM HG: CPT | Performed by: NURSE PRACTITIONER

## 2024-04-23 PROCEDURE — 3074F SYST BP LT 130 MM HG: CPT | Performed by: NURSE PRACTITIONER

## 2024-04-23 PROCEDURE — 99214 OFFICE O/P EST MOD 30 MIN: CPT | Performed by: NURSE PRACTITIONER

## 2024-04-23 RX ORDER — LATANOPROST 50 UG/ML
1 SOLUTION/ DROPS OPHTHALMIC EVERY EVENING
COMMUNITY
Start: 2024-03-04

## 2024-04-23 RX ORDER — TIRZEPATIDE 2.5 MG/.5ML
2.5 INJECTION, SOLUTION SUBCUTANEOUS WEEKLY
Qty: 2 ML | Refills: 1 | Status: SHIPPED | OUTPATIENT
Start: 2024-04-23

## 2024-04-23 NOTE — PROGRESS NOTES
HISTORY OF PRESENT ILLNESS  Chief Complaint   Patient presents with    Weight Check     +12     Khanh Honeycutt is a 43 year old male here for follow up with medical weight loss program for the treatment of overweight, obesity, or morbid obesity.     Up #12 lbs  Non-compliant on vyvanse 30 mg (stopped taking in feb. 2024, due to shortages and didn't feel like it was working as well)     Is going to be graduating in aug. 2024  Has reduced eating out, but need to learn how to cook for himself   Is possibly interested in joining gym, is not sure what to when he is there  Exercise/Activity: 2x/ week, via walking 10,000 steps  Nutrition: eating regular meals, +protein, minimal veggies. + tracking reports  Meals out per week on average: 1-2  Stress is manageable   Sleep: 5-6 hours/night, waking up feeling rested on CPAP    Denies chest pain, shortness of breath, dizziness, blurred vision, headache, paresthesia, nausea/vomiting.     Breakfast Lunch Dinner Snacks Fluids   Reviewed              Wt Readings from Last 6 Encounters:   04/23/24 268 lb (121.6 kg)   02/21/24 259 lb (117.5 kg)   01/08/24 256 lb (116.1 kg)   11/02/23 243 lb 3.2 oz (110.3 kg)   10/11/23 241 lb 4 oz (109.4 kg)   08/30/23 233 lb (105.7 kg)          REVIEW OF SYSTEMS  GENERAL: feels well otherwise, denied any fevers chills or night sweats   LUNGS: denies shortness of breath  CARDIOVASCULAR: denies chest pain  GI: denies abdominal pain  MUSCULOSKELETAL: denies back pain, joint pains   PSYCH: denies change in behavior or mood, denies feeling sad or depressed    EXAM  /80   Pulse 84   Resp 16   Ht 5' 7.5\" (1.715 m)   Wt 268 lb (121.6 kg)   BMI 41.36 kg/m²       GENERAL: well developed, well nourished, in no apparent distress, A/O x3  SKIN: no rashes, no suspicious lesions  HEENT: atraumatic, normocephalic, OP-clear, PERRLA  NECK: supple, no adenopathy  LUNGS: CTA in all fields, breathing non labored  CARDIO: RRR without murmur  GI: +BS,  NT/ND, no masses or HSM  EXTREMITIES: no cyanosis, no clubbing, no edema    Lab Results   Component Value Date     (H) 02/22/2024    BUN 13 02/22/2024    BUNCREA 12.4 12/21/2020    CREATSERUM 1.11 02/22/2024    ANIONGAP 5 02/22/2024    GFRNAA 83 08/01/2022    GFRAA 96 08/01/2022    CA 9.8 02/22/2024    OSMOCALC 293 02/22/2024    ALKPHO 71 02/20/2024    AST 31 02/20/2024    ALT 67 (H) 02/20/2024    BILT 0.4 02/20/2024    TP 7.5 02/20/2024    ALB 4.4 02/20/2024    GLOBULIN 3.3 08/15/2023     02/22/2024    K 3.7 02/22/2024     02/22/2024    CO2 27.0 02/22/2024     Lab Results   Component Value Date     02/22/2024    A1C 5.9 (H) 02/22/2024     Lab Results   Component Value Date    CHOLEST 177 02/22/2024    TRIG 219 (H) 02/22/2024    HDL 44 02/22/2024    LDL 96 02/22/2024    VLDL 36 (H) 02/22/2024    NONHDLC 133 (H) 02/22/2024     Lab Results   Component Value Date    B12 402 10/24/2022     Lab Results   Component Value Date    VITD 41.3 02/22/2024       Current Outpatient Medications on File Prior to Visit   Medication Sig Dispense Refill    latanoprost 0.005 % Ophthalmic Solution Place 1 drop into both eyes every evening.      allopurinol 300 MG Oral Tab Take 1 tablet (300 mg total) by mouth daily. 90 tablet 0    atorvastatin 20 MG Oral Tab Take 1 tablet (20 mg total) by mouth daily. 90 tablet 0    lisinopril-hydroCHLOROthiazide 20-12.5 MG Oral Tab Take 1 tablet by mouth daily. 90 tablet 0     No current facility-administered medications on file prior to visit.       ASSESSMENT/PLAN    ICD-10-CM    1. Therapeutic drug monitoring  Z51.81       2. Obesity, Class II, BMI 35-39.9  E66.9       3. Vitamin D deficiency  E55.9       4. Prediabetes  R73.03       5. Binge eating  R63.2       6. Essential hypertension  I10       7. Dyslipidemia  E78.5       8. STEWART on CPAP  G47.33       9. Elevated liver enzymes  R74.8           PLAN   Initial Weight Data and Goal Weight Loss:  Initial consult: #284 lbs  on 10/2022  Weight Calculations  Initial Weight: 284 lbs  Initial Weight Date: 10/01/22  Today's Weight: 268 lbs  5% Goal: 14.2  10% Goal: 28.4  Total Weight Loss: 16 lbs  Total weight loss: up #12 lbs total, Net loss 16 lbs  Had stopped medications: vyvnase 30mg (since it wasn't working anymore)  Will trial zepbound 2.5mg weekly x4 weeks and then (send in OrderBorder message in 3 weeks) to say either you want to stay at the same dose or increase to 5mg. Denies any personal or family history of pancreatitis, pancreatic cancer, thyroid cancer, MEN2  (is going to pay out of pocket $550)    --advised of side effects and adverse effects of this medication  Contradictions: phentermine, ozempic (not covered by insurance anymore)   Reviewed labs  Binge eating- stable  Will continue with vitamin d OTC  HTN stable, will continue to monitor, managed by PCP  HLD (HDL low), lifestyle education done, follows with PCP  Fatty liver, lifestyle education done  STEWART- continue with CPAP machine nightly  Hx Prediabetes, last a1c 5.3% on 8/2023  C/o of knee pain, encouraged aqua therapy- discussed getting gym membership or fitness center (to have someone help him)    Wrote out macros again and encouraged to track food   Nutrition: Low carb diet, recommended to eat breakfast daily/ regular protein intake  Follow up with dietitian and psychologist as recommended.  Discussed the role of sleep and stress in weight management.  Counseled on comprehensive weight loss plan including attention to nutrition, exercise and behavior/stress management for success. See patient instruction below for more details.  Discussed strategies to overcome barriers to successful weight loss and weight maintenance  FITTE: ACSM recommendations (150-300 minutes/ week in active weight loss)   Weight Loss Consent to treat reviewed and signed.    Total time spent on chart review, pre-charting, obtaining history, counseling, and educating, reviewing labs was 30 minutes.        NOTE TO PATIENT: The 21st Century Cures Act makes clinical notes like these available to patients in the interest of transparency. Clinical notes are medical documents used by physicians and care providers to communicate with each other. These documents include medical language and terminology, abbreviations, and treatment information that may sound technical and at times possibly unfamiliar. In addition, at times, the verbiage may appear blunt or direct. These documents are one tool providers use to communicate relevant information and clinical opinions of the care providers in a way that allows common understanding of the clinical context.     Patient Instructions   Next steps:  1.  Fill your prescribed medication and take as discussed and prescribed:   2.  Schedule a personal nutrition consultation with one of our registered dieticians     Please try to work on the following dietary changes:  Daily protein recommendation to start:  grams  Daily carbohydrate:   Daily calories:   1.  Drink water with meals and throughout the day, cut down on soda and/or juice if consumed. Consider flavored water options like Bubbly, Spindrift, Hint and Brenda.  2.  Eat breakfast daily and focus on having protein with each meal, examples include: greek yogurt, cottage cheese, hard boiled egg, whole grain toast with peanut butter.   3.  Reduce refined carbohydrates and sugars which includes items such as sweets, as well as rice, pasta, and bread and make sure to choose whole grain options when having them with just 1 serving per meal about the size of your inner palm.  4.  Consume non starchy veggies daily working towards making them a good 50% of your daily food intake. Add them to lunch and dinner consistently.  5.  Start a daily probiotic: VSL#3 is recommended, (order on line at www.vsl3.com). Take 1 capsule daily with water for 30 days, then reduce to 1 every other day (this will reduce the cost). Capsules can be left out  for 2 weeks, but then must be refrigerated.      Please download héctor My Fitness Pal, LoseIt! Or Net Diary to monitor daily dietary intake and you will be able to see if you are eating the right amount of calories or too much or too little which would hinder weight loss. Additionally this will help to see your daily carbohydrate and protein intake. When you set the héctor up choose 1-2 lbs/week as a goal.  Keeping a paper food journal is an option as well to remain accountable for your choices- this is the start to mindful eating! A low calorie diet has been consistently shown to support weight loss.     Continue or start exercising to help establish a routine. If not already exercising begin with 1 day and progress as able with long-term goal of 30 minutes 5 days a week at a minimum.     Meditation daily can help manage and control stress. Chronic stress can make weight loss difficult.  Exercising is one way to help with stress, but meditation using the CALM Héctor or another comparable alternative can be done in your home or place of work with little time commitment. This Héctor can also help work on behavior change and improve sleep. Check out the segment under Calm Masterclass and listen to The 4 Pillars of Health. A great way to begin learning about the foundation of lifestyle with practical tips to use in your every day.     Check out www.yourweightmatters.org blog for continued daily support and education along this weight loss journey!    Patient Resources:     Personal Training/Fitness Classes/Health Coaching     [facility] @ [email or web address] Full fitness center with group fitness and personal training. Discount available as client of [facility].  Health Coaching and Personal Training with Dacia Wallis at our [facility]- individual weekly coaching with option to add personal training and small group fitness classes  targeted at weight loss- [phone] and/or email @ [email or web address]  [employer] [place] [email or web address]. Group Fitness [phone] and/or email Anette at [email or web address]  [facility] Fitness Centers with multiple locations: CambridgeSoft (www.Arterial Remodeling Technologies), MeeGenius The Gun.io (www.eatthefrogfitness.com), Fit Body Bootcamp (www.LYCEEMbodyControlScanpCaustic Graphics), Dashbell (www.myinfoQ), The Exercise  (www.exercisecoach.Secustream Technologies)     Online Fitness  Fitness  on Hoverink  Fit in 10 DVD series- wwwAxis SemiconductorD.com  Sit and Be Fit - Chair exercise series Www.sitandbefit.Clikthrough  Hip Hop Fit with Ashwin Lawrence at www.hiphopfit.Zenefits     Apps for on the Go Fitness  Cuponomia 7 Minute Workout (orange box with white 7) - free on the go HIIT training héctor  Peloton Héctor @ www.onepeloton.com     Nutrition Trackers and Tools  LoseIT! And My Fitness Pal apps and on line for tracking nutrition  NOOM - virtual health coaching  FitFoundation (healthy meals on the go) in [place] @ wwwTapCanvasjfdmdiltjnpsz5oCaustic Graphics  Justyn NORWOOD @ www.bistromd.com and Tclrno34 (keto and low carb plans recommended) @ www.iffngp31.com, Metabolic Meals @ www.MyMetabolicMeals.com - individual prepared meals to go  Gobble, Blue Apron, Home , Every Plate, Sunbasket- on line meal delivery programs for preparation at home  Meal Village in [place] for homemade meals to go @ www.mealvillage.[employer]ustream Technologies  Diet Doctor @ www.dietdoctor.com - low carb swaps  Yummly - meal prep and planning héctor (www.yummly.com)     Stress Management/Behavior/Mindful Eating  CALM meditation héctor (www.calm.com)  Headspace  Am I Hungry? Mindful eating virtual  héctor  Www.yourweightmatters.org - Obesity Action Coalition sponsored Blog posts daily  Motivation héctor (black box with white \")- daily supportive messages sent to your phone     Books/Video Education/Podcasts  Mindless Eating by Misael Hylton  Why We Get Sick by Davi Russell (a book about  insulin resistance)  Atomic Habits by Salvador Neumann (a book about taking small steps to promote greater behavior change)   Can't Hurt Me by Rick Seals (a book exploring the power of discipline in achieving your goals)  The End of Dieting: How to Live for Life by Dr. Jerson Keenan M.D. or listen to [employer] Podcast Episode 63: Understanding \"Nutritarian\" Eating w/Dr. Jerson Keenan  Your Body in Balance: The New Science of Food, Hormones, and Health by Dr. Scottie Schafer  The Menopause Diet Plan by Court Smiley and Lenore Wagner  The Complete Guide to fasting by Dr. Edge  Sugar, Salt & Fat by Leann Galeana, Ph.D, R.D.  Weight Loss Surgery Will Not Treat Food Addiction by Jocelynn Beltran Ph.D  The Game Changers- [employer] Documentary on plant based nutrition  Fed Up - documentary about obesity (Free on Bricsnet)  The Truth About Sugar - documentary on sugar (Free on Bricsnet, https://youtu.be/6F5kuocKI0y)  The Dr. Florian T5 Wellness Plan by Dr. Casper Florian MD  Fitlosophy Fitspiration - journal to better health (found at [employer] in fitness aisle)  What Happened to You?- a look at the impact trauma has on behavior written by Devan Willett and Dr. Kaiden Barajas  Whole Again by Ray Worthy - discovering your true self after trauma  Keisha Ernst talk on [employer], The Call to Courage  Podcasts: The Exam Room by the Physician's Committee, Nutrition Facts by Dr. Gonzalez    We are here to support you with weight loss, but please remember that you still need your primary care provider for your routine health maintenance.      No follow-ups on file.    Patient verbalizes understanding.    Diana Powers, APRN

## 2024-04-23 NOTE — PATIENT INSTRUCTIONS
Next steps:  1.  Fill your prescribed medication and take as discussed and prescribed: zepbound 2.5mg weekly x 4 weeks   2.  Schedule a personal nutrition consultation with one of our registered dieticians     Please try to work on the following dietary changes:  Daily protein recommendation to start:  grams  Daily carbohydrate: <170g  Daily calories: 1,900-2,000  1.  Drink water with meals and throughout the day, cut down on soda and/or juice if consumed. Consider flavored water options like Bubbly, Spindrift, Hint and Brenda.  2.  Eat breakfast daily and focus on having protein with each meal, examples include: greek yogurt, cottage cheese, hard boiled egg, whole grain toast with peanut butter.   3.  Reduce refined carbohydrates and sugars which includes items such as sweets, as well as rice, pasta, and bread and make sure to choose whole grain options when having them with just 1 serving per meal about the size of your inner palm.  4.  Consume non starchy veggies daily working towards making them a good 50% of your daily food intake. Add them to lunch and dinner consistently.  5.  Start a daily probiotic: VSL#3 is recommended, (order on line at www.vsl3.com). Take 1 capsule daily with water for 30 days, then reduce to 1 every other day (this will reduce the cost). Capsules can be left out for 2 weeks, but then must be refrigerated.      Please download luna My Fitness Pal, LoseIt! Or Net Diary to monitor daily dietary intake and you will be able to see if you are eating the right amount of calories or too much or too little which would hinder weight loss. Additionally this will help to see your daily carbohydrate and protein intake. When you set the luna up choose 1-2 lbs/week as a goal.  Keeping a paper food journal is an option as well to remain accountable for your choices- this is the start to mindful eating! A low calorie diet has been consistently shown to support weight loss.     Continue or start  exercising to help establish a routine. If not already exercising begin with 1 day and progress as able with long-term goal of 30 minutes 5 days a week at a minimum.     Meditation daily can help manage and control stress. Chronic stress can make weight loss difficult.  Exercising is one way to help with stress, but meditation using the CALM Héctor or another comparable alternative can be done in your home or place of work with little time commitment. This Héctor can also help work on behavior change and improve sleep. Check out the segment under Calm Masterclass and listen to The 4 Pillars of Health. A great way to begin learning about the foundation of lifestyle with practical tips to use in your every day.     Check out www.yourweightmatters.org blog for continued daily support and education along this weight loss journey!    Patient Resources:     Personal Training/Fitness Classes/Health Coaching     [facility] @ [email or web address]-center Full fitness center with group fitness and personal training. Discount available as client of [facility].  Health Coaching and Personal Training with Dacia Wallis at our [facility]- individual weekly coaching with option to add personal training and small group fitness classes targeted at weight loss- [phone] and/or email @ [email or web address]  360FIT [place] http://www.LeapSky Wireless. Group Fitness [phone] and/or email Anette at [email or web address]  [facility] Fitness Centers with multiple locations: CityNews (www.Bragg Peak Systems), Eat The Frog Fitness (www.Lovejuice.Banyan Biomarkers), Fit Body Bootcamp (www.Fluid EntertainmentbodybootAerify Mediap.Banyan Biomarkers), 2Peer (Qlipso) Fitness (www.Dark Mail Alliance.Banyan Biomarkers), The Exercise  (www.exercisecoach.Banyan Biomarkers)     Online Fitness  Fitness  on RadPad  Fit in 10 DVD series- www.keech78TDV.com  Sit and Be Fit - Chair exercise series  Www.sitandbefit.org  Hip Hop Fit with Ashwin Lawrence at www.hiphopfit.net     Apps for on the Go Fitness  Freepath 7 Minute Workout (orange box with white 7) - free on the go HIIT training héctor  Peloton Héctor @ www.onepeloton.com     Nutrition Trackers and Tools  LoseIT! And My Fitness Pal apps and on line for tracking nutrition  NOOM - virtual health coaching  FitFoundation (healthy meals on the go) in [place] @ www.dcsxisqtxqrbj7kAlphaSmart  Justyn NORWOOD @ wwwWeb Wonksbistromd.com and Hgvpsa81 (keto and low carb plans recommended) @ www.mmvqhn56.com, Metabolic Meals @ www.MyMetabolicMeals.com - individual prepared meals to go  Gobble, Blue Apron, Home , Every Plate, Sunbasket- on line meal delivery programs for preparation at home  Meal Village in [place] for homemade meals to go @ www.meal1000jobboersen.deage.Floop  Diet Doctor @ www.dietdoctor.Floop - low carb swaps  Yummly - meal prep and planning héctor (www.yummly.com)     Stress Management/Behavior/Mindful Eating  CALM meditation héctor (www.calm.com)  Headspace  Am I Hungry? Mindful eating virtual  héctor  Www.yourweightmatters.org - Obesity Action Coalition sponsored Blog posts daily  Motivation héctor (black box with white \")- daily supportive messages sent to your phone     Books/Video Education/Podcasts  Mindless Eating by Misael Hylton  Why We Get Sick by Davi Russell (a book about insulin resistance)  Atomic Habits by Salvador Neumann (a book about taking small steps to promote greater behavior change)   Can't Hurt Me by Rick Seals (a book exploring the power of discipline in achieving your goals)  The End of Dieting: How to Live for Life by Dr. Jerson Keenan M.D. or listen to The FireHost Podcast Episode 63: Understanding \"Nutritarian\" Eating w/Dr. Jerson Keenan  Your Body in Balance: The New Science of Food, Hormones, and Health by Dr. Scottie Schafer  The Menopause Diet Plan by Court Smiley and Lenore Wagner  The Complete Guide to fasting by Dr. Edge  Sugar, Salt & Fat by Leann  Kervin, Ph.D, R.D.  Weight Loss Surgery Will Not Treat Food Addiction by Jocelynn Beltran Ph.D  The Game Changers- Simplerix Documentary on plant based nutrition  Fed Up - documentary about obesity (Free on Utube)  The Truth About Sugar - documentary on sugar (Free on Utube, https://youtu.be/6R8vwunLG8f)  The Dr. Florian T5 Wellness Plan by Dr. Casper Florian MD  Fitlosophy Fitspiration - journal to better health (found at [employer] in fitness aisle)  What Happened to You?- a look at the impact trauma has on behavior written by Devan Willett and Dr. Kaiden Barajas  Whole Again by Ray Worthy - discovering your true self after trauma  Keisha Ernst talk on Miraculins, The Call to Courage  Podcasts: The Exam Room by the Physician's Committee, Nutrition Facts by Dr. Gonzalez    We are here to support you with weight loss, but please remember that you still need your primary care provider for your routine health maintenance.

## 2024-05-30 DIAGNOSIS — E78.00 HIGH CHOLESTEROL: ICD-10-CM

## 2024-05-30 DIAGNOSIS — I10 ESSENTIAL HYPERTENSION: ICD-10-CM

## 2024-05-30 DIAGNOSIS — M10.9 GOUT OF BOTH KNEES: ICD-10-CM

## 2024-06-03 DIAGNOSIS — I10 ESSENTIAL HYPERTENSION: ICD-10-CM

## 2024-06-03 DIAGNOSIS — M10.9 GOUT OF BOTH KNEES: ICD-10-CM

## 2024-06-03 DIAGNOSIS — E78.00 HIGH CHOLESTEROL: ICD-10-CM

## 2024-06-03 RX ORDER — ATORVASTATIN CALCIUM 20 MG/1
20 TABLET, FILM COATED ORAL DAILY
Qty: 90 TABLET | Refills: 1 | Status: SHIPPED | OUTPATIENT
Start: 2024-06-03

## 2024-06-03 RX ORDER — LISINOPRIL AND HYDROCHLOROTHIAZIDE 20; 12.5 MG/1; MG/1
1 TABLET ORAL DAILY
Qty: 90 TABLET | Refills: 1 | Status: SHIPPED | OUTPATIENT
Start: 2024-06-03

## 2024-06-03 RX ORDER — ALLOPURINOL 300 MG/1
300 TABLET ORAL DAILY
Qty: 90 TABLET | Refills: 1 | Status: SHIPPED | OUTPATIENT
Start: 2024-06-03

## 2024-06-04 RX ORDER — ATORVASTATIN CALCIUM 20 MG/1
20 TABLET, FILM COATED ORAL DAILY
Qty: 90 TABLET | Refills: 0 | OUTPATIENT
Start: 2024-06-04

## 2024-06-04 RX ORDER — ALLOPURINOL 300 MG/1
300 TABLET ORAL DAILY
Qty: 90 TABLET | Refills: 0 | OUTPATIENT
Start: 2024-06-04

## 2024-06-04 RX ORDER — LISINOPRIL AND HYDROCHLOROTHIAZIDE 20; 12.5 MG/1; MG/1
1 TABLET ORAL DAILY
Qty: 90 TABLET | Refills: 0 | OUTPATIENT
Start: 2024-06-04

## 2024-06-04 NOTE — TELEPHONE ENCOUNTER
Rx denied  Allopurinol, lisinopril hydrochlorothiazide and atorvastatin sent yesterday  MCM sent to patient to let him know it was already sent

## 2024-06-24 ENCOUNTER — PATIENT MESSAGE (OUTPATIENT)
Dept: INTERNAL MEDICINE CLINIC | Facility: CLINIC | Age: 44
End: 2024-06-24

## 2024-06-24 RX ORDER — TIRZEPATIDE 5 MG/.5ML
5 INJECTION, SOLUTION SUBCUTANEOUS WEEKLY
Qty: 2 ML | Refills: 1 | Status: SHIPPED | OUTPATIENT
Start: 2024-06-24 | End: 2024-07-16

## 2024-06-24 NOTE — TELEPHONE ENCOUNTER
From: Khanh Honeycutt  To: Kristen Fetter  Sent: 6/24/2024 8:33 AM CDT  Subject: Zepbound step-up    Hello, this morning I've just taken my 4th shot in my 2nd box (8th overall) of Zepbound at the 0.25mg dose. Spacing it out slightly as discussed, I've been taking a dose around every 8-10 days. I'm handling it well and would like to step up to 0.50mg with this refill. Please let me know what you think. Thanks!

## 2024-06-27 ENCOUNTER — LAB ENCOUNTER (OUTPATIENT)
Dept: LAB | Age: 44
End: 2024-06-27
Attending: NURSE PRACTITIONER
Payer: COMMERCIAL

## 2024-06-27 DIAGNOSIS — K76.0 FATTY LIVER: ICD-10-CM

## 2024-06-27 DIAGNOSIS — R74.01 ELEVATED ALT MEASUREMENT: ICD-10-CM

## 2024-06-27 LAB
ALBUMIN SERPL-MCNC: 4.2 G/DL (ref 3.4–5)
ALP LIVER SERPL-CCNC: 71 U/L
ALT SERPL-CCNC: 67 U/L
AST SERPL-CCNC: 26 U/L (ref 15–37)
BASOPHILS # BLD AUTO: 0.06 X10(3) UL (ref 0–0.2)
BASOPHILS NFR BLD AUTO: 0.8 %
BILIRUB DIRECT SERPL-MCNC: <0.1 MG/DL (ref 0–0.2)
BILIRUB SERPL-MCNC: 0.3 MG/DL (ref 0.1–2)
EOSINOPHIL # BLD AUTO: 0.14 X10(3) UL (ref 0–0.7)
EOSINOPHIL NFR BLD AUTO: 1.8 %
ERYTHROCYTE [DISTWIDTH] IN BLOOD BY AUTOMATED COUNT: 13.9 %
HCT VFR BLD AUTO: 48.4 %
HGB BLD-MCNC: 16.6 G/DL
IMM GRANULOCYTES # BLD AUTO: 0.01 X10(3) UL (ref 0–1)
IMM GRANULOCYTES NFR BLD: 0.1 %
LYMPHOCYTES # BLD AUTO: 4.46 X10(3) UL (ref 1–4)
LYMPHOCYTES NFR BLD AUTO: 56 %
MCH RBC QN AUTO: 31.3 PG (ref 26–34)
MCHC RBC AUTO-ENTMCNC: 34.3 G/DL (ref 31–37)
MCV RBC AUTO: 91.3 FL
MONOCYTES # BLD AUTO: 0.48 X10(3) UL (ref 0.1–1)
MONOCYTES NFR BLD AUTO: 6 %
NEUTROPHILS # BLD AUTO: 2.81 X10 (3) UL (ref 1.5–7.7)
NEUTROPHILS # BLD AUTO: 2.81 X10(3) UL (ref 1.5–7.7)
NEUTROPHILS NFR BLD AUTO: 35.3 %
PLATELET # BLD AUTO: 244 10(3)UL (ref 150–450)
PROT SERPL-MCNC: 7.3 G/DL (ref 6.4–8.2)
RBC # BLD AUTO: 5.3 X10(6)UL
WBC # BLD AUTO: 8 X10(3) UL (ref 4–11)

## 2024-06-27 PROCEDURE — 80076 HEPATIC FUNCTION PANEL: CPT

## 2024-06-27 PROCEDURE — 85025 COMPLETE CBC W/AUTO DIFF WBC: CPT

## 2024-06-27 PROCEDURE — 36415 COLL VENOUS BLD VENIPUNCTURE: CPT

## 2024-09-16 ENCOUNTER — OFFICE VISIT (OUTPATIENT)
Dept: INTERNAL MEDICINE CLINIC | Facility: CLINIC | Age: 44
End: 2024-09-16
Payer: COMMERCIAL

## 2024-09-16 VITALS
RESPIRATION RATE: 16 BRPM | HEIGHT: 67.5 IN | DIASTOLIC BLOOD PRESSURE: 80 MMHG | HEART RATE: 80 BPM | BODY MASS INDEX: 41.42 KG/M2 | SYSTOLIC BLOOD PRESSURE: 124 MMHG | WEIGHT: 267 LBS

## 2024-09-16 DIAGNOSIS — G47.33 OSA ON CPAP: ICD-10-CM

## 2024-09-16 DIAGNOSIS — E55.9 VITAMIN D DEFICIENCY: ICD-10-CM

## 2024-09-16 DIAGNOSIS — I10 ESSENTIAL HYPERTENSION: ICD-10-CM

## 2024-09-16 DIAGNOSIS — R74.8 ELEVATED LIVER ENZYMES: ICD-10-CM

## 2024-09-16 DIAGNOSIS — R63.2 BINGE EATING: ICD-10-CM

## 2024-09-16 DIAGNOSIS — E66.9 OBESITY, CLASS II, BMI 35-39.9: ICD-10-CM

## 2024-09-16 DIAGNOSIS — R73.03 PREDIABETES: ICD-10-CM

## 2024-09-16 DIAGNOSIS — E78.5 DYSLIPIDEMIA: ICD-10-CM

## 2024-09-16 DIAGNOSIS — Z51.81 THERAPEUTIC DRUG MONITORING: Primary | ICD-10-CM

## 2024-09-16 PROCEDURE — 3079F DIAST BP 80-89 MM HG: CPT | Performed by: NURSE PRACTITIONER

## 2024-09-16 PROCEDURE — 99213 OFFICE O/P EST LOW 20 MIN: CPT | Performed by: NURSE PRACTITIONER

## 2024-09-16 PROCEDURE — 3008F BODY MASS INDEX DOCD: CPT | Performed by: NURSE PRACTITIONER

## 2024-09-16 PROCEDURE — 3074F SYST BP LT 130 MM HG: CPT | Performed by: NURSE PRACTITIONER

## 2024-09-16 RX ORDER — TIRZEPATIDE 7.5 MG/.5ML
7.5 INJECTION, SOLUTION SUBCUTANEOUS WEEKLY
Qty: 2 ML | Refills: 2 | Status: SHIPPED | OUTPATIENT
Start: 2024-09-16 | End: 2024-10-08

## 2024-09-16 RX ORDER — TIRZEPATIDE 5 MG/.5ML
INJECTION, SOLUTION SUBCUTANEOUS
COMMUNITY
Start: 2024-07-24

## 2024-09-16 NOTE — PATIENT INSTRUCTIONS
Next steps:  1.  Fill your prescribed medication and take as discussed and prescribed: zepbound 7.5mg weekly   2.  Schedule a personal nutrition consultation with one of our registered dieticians     Please try to work on the following dietary changes:  Daily protein recommendation to start:  grams  Daily carbohydrate: <170g  Daily calories: 1,900-2,000  1.  Drink water with meals and throughout the day, cut down on soda and/or juice if consumed. Consider flavored water options like Bubbly, Spindrift, Hint and Brenda.  2.  Eat breakfast daily and focus on having protein with each meal, examples include: greek yogurt, cottage cheese, hard boiled egg, whole grain toast with peanut butter.   3.  Reduce refined carbohydrates and sugars which includes items such as sweets, as well as rice, pasta, and bread and make sure to choose whole grain options when having them with just 1 serving per meal about the size of your inner palm.  4.  Consume non starchy veggies daily working towards making them a good 50% of your daily food intake. Add them to lunch and dinner consistently.  5.  Start a daily probiotic: VSL#3 is recommended, (order on line at www.vsl3.com). Take 1 capsule daily with water for 30 days, then reduce to 1 every other day (this will reduce the cost). Capsules can be left out for 2 weeks, but then must be refrigerated.      Please download luna My Fitness Pal, LoseIt! Or Net Diary to monitor daily dietary intake and you will be able to see if you are eating the right amount of calories or too much or too little which would hinder weight loss. Additionally this will help to see your daily carbohydrate and protein intake. When you set the luna up choose 1-2 lbs/week as a goal.  Keeping a paper food journal is an option as well to remain accountable for your choices- this is the start to mindful eating! A low calorie diet has been consistently shown to support weight loss.     Continue or start exercising to  help establish a routine. If not already exercising begin with 1 day and progress as able with long-term goal of 30 minutes 5 days a week at a minimum.     Meditation daily can help manage and control stress. Chronic stress can make weight loss difficult.  Exercising is one way to help with stress, but meditation using the CALM Héctor or another comparable alternative can be done in your home or place of work with little time commitment. This Héctor can also help work on behavior change and improve sleep. Check out the segment under Calm Masterclass and listen to The 4 Pillars of Health. A great way to begin learning about the foundation of lifestyle with practical tips to use in your every day.     Check out www.yourweightmatters.org blog for continued daily support and education along this weight loss journey!    Patient Resources:     Personal Training/Fitness Classes/Health Coaching     [facility] @ [email or web address]-[email or web address]/[email or web address]-center Full fitness center with group fitness and personal training. Discount available as client of [facility].  Health Coaching and Personal Training with Dacia Wallis at our [facility]- individual weekly coaching with option to add personal training and small group fitness classes targeted at weight loss- [phone] and/or email @ [email or web address]  360FIT [place] http://www.HealthCare Impact Associates. Group Fitness [phone] and/or email Anette at [email or web address]  [facility] Fitness Centers with multiple locations: worldhistoryproject (www.Quotations Book), Eat The HelpAround Fitness (www.Michael Bieker.Reimage), Fit Body Bootcamp (www.Collective Digital StudiobodyboRexterp.Reimage), morphCARD (www.Decade Worldwide.Reimage), The Exercise  (www.exercisecoach.Reimage)     Online Fitness  Fitness  on Utube  Fit in 10 DVD series- www.ytrkt25UDL.com  Sit and Be Fit - Chair exercise series Www.sitandbefit.org  Hip  Hop Fit with Ashwin Lawrence at www.hiphopfit.net     Apps for on the Go Fitness  [place] 7 Minute Workout (orange box with white 7) - free on the go HIIT training héctor  Peloton Héctor @ www.onepeloton.com     Nutrition Trackers and Tools  LoseIT! And My Fitness Pal apps and on line for tracking nutrition  NOOM - virtual health coaching  FitFoundation (healthy meals on the go) in [place] @ www.mpgrlbbzurmtj7s.The Knowland Group  Bistro MD @ Dial2Do.bistromd.com and Zpvmlu41 (keto and low carb plans recommended) @ www.dzpbjb82.com, Metabolic Meals @ www.MyMetabolicMeals.com - individual prepared meals to go  Gobble, Blue Apron, Home , Every Plate, Sunbasket- on line meal delivery programs for preparation at home  Meal Village in [place] for homemade meals to go @ www.mealvillage.The Knowland Group  Diet Doctor @ www.dietdoctor.The Knowland Group - low carb swaps  YuInfogram - meal prep and planning héctor (www.yummly.com)     Stress Management/Behavior/Mindful Eating  CALM meditation héctor (www.calm.com)  Headspace  Am I Hungry? Mindful eating virtual  héctor  Www.yourweightmatters.org - Obesity Action Coalition sponsored Blog posts daily  Motivation héctor (black box with white \")- daily supportive messages sent to your phone     Books/Video Education/Podcasts  Mindless Eating by Misael Hylton  Why We Get Sick by Davi Russell (a book about insulin resistance)  Atomic Habits by Salvador Neumann (a book about taking small steps to promote greater behavior change)   Can't Hurt Me by Rick Seals (a book exploring the power of discipline in achieving your goals)  The End of Dieting: How to Live for Life by Dr. Jerson Keenan M.D. or listen to The Immunetics Academy Podcast Episode 63: Understanding \"Nutritarian\" Eating w/Dr. Jerson Keenan  Your Body in Balance: The New Science of Food, Hormones, and Health by Dr. Scottie Schafer  The Menopause Diet Plan by Court Smiley and Lenore Wagner  The Complete Guide to fasting by Dr. Edge  Sugar, Salt & Fat by Leann Galeana, Ph.D, R.D.  Weight  Loss Surgery Will Not Treat Food Addiction by Jocelynn Beltran Ph.D  The Game Changers- Mirage Networksix Documentary on plant based nutrition  Fed Up - documentary about obesity (Free on Utube)  The Truth About Sugar - documentary on sugar (Free on Utube, https://youWritePathu.be/4F5yitdRE5l)  The Dr. Florian T5 Wellness Plan by Dr. Casper Florian MD  Fitlosophy Fitspiration - journal to better health (found at [employer] in fitness aisle)  What Happened to You?- a look at the impact trauma has on behavior written by Devan Willett and Dr. Kaiden Barajas  Whole Again by Ray Worthy - discovering your true self after trauma  Keisha Ernst talk on [facility], The Call to Courage  Podcasts: The Exam Room by the Physician's Committee, Nutrition Facts by Dr. Gonzalez    We are here to support you with weight loss, but please remember that you still need your primary care provider for your routine health maintenance.

## 2024-09-16 NOTE — PROGRESS NOTES
HISTORY OF PRESENT ILLNESS  Chief Complaint   Patient presents with    Weight Check     -1     Khanh Honeycutt is a 44 year old male here for follow up with medical weight loss program for the treatment of overweight, obesity, or morbid obesity.     Down 1 lbs (f/u from 4/2024)   Compliant on zepbound 5mg weekly   Tolerating well, helping with decreasing appetite and no side effects     Finished with grad school, is now looking for jobs  Cooking mostly at home   Exercise/Activity: 2-3x/ week, via walking 5,000-8,000 steps  Nutrition: eating regular meals, +protein, minimal veggies. + tracking reports  Meals out per week on average: 0-1  Stress is manageable   Sleep: 5-6 hours/night, waking up feeling rested    Denies chest pain, shortness of breath, dizziness, blurred vision, headache, paresthesia, nausea/vomiting.     Breakfast Lunch Dinner Snacks Fluids   Reviewed              Wt Readings from Last 6 Encounters:   09/16/24 267 lb (121.1 kg)   04/23/24 268 lb (121.6 kg)   02/21/24 259 lb (117.5 kg)   01/08/24 256 lb (116.1 kg)   11/02/23 243 lb 3.2 oz (110.3 kg)   10/11/23 241 lb 4 oz (109.4 kg)          REVIEW OF SYSTEMS  GENERAL: feels well otherwise, denied any fevers chills or night sweats   LUNGS: denies shortness of breath  CARDIOVASCULAR: denies chest pain  GI: denies abdominal pain  MUSCULOSKELETAL: denies back pain, joint pains   PSYCH: denies change in behavior or mood, denies feeling sad or depressed    EXAM  /80   Pulse 80   Resp 16   Ht 5' 7.5\" (1.715 m)   Wt 267 lb (121.1 kg)   BMI 41.20 kg/m²       GENERAL: well developed, well nourished, in no apparent distress, A/O x3  SKIN: no rashes, no suspicious lesions  HEENT: atraumatic, normocephalic, OP-clear, PERRLA  NECK: supple, no adenopathy  LUNGS: CTA in all fields, breathing non labored  CARDIO: RRR without murmur  GI: +BS, NT/ND, no masses or HSM  EXTREMITIES: no cyanosis, no clubbing, no edema    Lab Results   Component Value  Date     (H) 02/22/2024    BUN 13 02/22/2024    BUNCREA 12.4 12/21/2020    CREATSERUM 1.11 02/22/2024    ANIONGAP 5 02/22/2024    GFRNAA 83 08/01/2022    GFRAA 96 08/01/2022    CA 9.8 02/22/2024    OSMOCALC 293 02/22/2024    ALKPHO 71 06/27/2024    AST 26 06/27/2024    ALT 67 (H) 06/27/2024    BILT 0.3 06/27/2024    TP 7.3 06/27/2024    ALB 4.2 06/27/2024    GLOBULIN 3.3 08/15/2023     02/22/2024    K 3.7 02/22/2024     02/22/2024    CO2 27.0 02/22/2024     Lab Results   Component Value Date     02/22/2024    A1C 5.9 (H) 02/22/2024     Lab Results   Component Value Date    CHOLEST 177 02/22/2024    TRIG 219 (H) 02/22/2024    HDL 44 02/22/2024    LDL 96 02/22/2024    VLDL 36 (H) 02/22/2024    NONHDLC 133 (H) 02/22/2024     Lab Results   Component Value Date    B12 402 10/24/2022     Lab Results   Component Value Date    VITD 41.3 02/22/2024       Current Outpatient Medications on File Prior to Visit   Medication Sig Dispense Refill    ZEPBOUND 5 MG/0.5ML Subcutaneous Solution Auto-injector Inject 5 mg into the skin once a week for 4 doses.      allopurinol 300 MG Oral Tab TAKE 1 TABLET DAILY 90 tablet 1    lisinopril-hydroCHLOROthiazide 20-12.5 MG Oral Tab TAKE 1 TABLET DAILY 90 tablet 1    atorvastatin 20 MG Oral Tab TAKE 1 TABLET DAILY 90 tablet 1    latanoprost 0.005 % Ophthalmic Solution Place 1 drop into both eyes every evening.       No current facility-administered medications on file prior to visit.       ASSESSMENT/PLAN    ICD-10-CM    1. Therapeutic drug monitoring  Z51.81       2. Obesity, Class II, BMI 35-39.9  E66.9       3. Vitamin D deficiency  E55.9       4. Essential hypertension  I10       5. Binge eating  R63.2       6. STEWART on CPAP  G47.33       7. Dyslipidemia  E78.5       8. Prediabetes  R73.03       9. Elevated liver enzymes  R74.8           PLAN   Initial Weight Data and Goal Weight Loss:  Initial consult: #284 lbs on 10/2022  Weight Calculations  Initial Weight: 284  lbs  Initial Weight Date: 10/01/22  Today's Weight: 267 lbs  5% Goal: 14.2  10% Goal: 28.4  Total Weight Loss: 17 lbs  Total weight loss: Down 1 lbs total, Net loss 17 lbs  Will increase medications: zepbound 7.5mg weekly   --advised of side effects and adverse effects of this medication  Contradictions: vyvanse, phentermine, ozempic (not covered by insurance anymore)   Reviewed labs  Binge eating- stable  Will continue with vitamin d OTC  HTN stable, will continue to monitor, managed by PCP  HLD (HDL low), lifestyle education done, follows with PCP  Fatty liver, lifestyle education done  STEWART- continue with CPAP machine nightly  Prediabetes, last a1c 5.9% on 2/2024  C/o of knee pain, encouraged aqua therapy- discussed getting gym membership or fitness center  Nutrition: Low carb diet, recommended to eat breakfast daily/ regular protein intake  Follow up with dietitian and psychologist as recommended.  Discussed the role of sleep and stress in weight management.  Counseled on comprehensive weight loss plan including attention to nutrition, exercise and behavior/stress management for success. See patient instruction below for more details.  Discussed strategies to overcome barriers to successful weight loss and weight maintenance  FITTE: ACSM recommendations (150-300 minutes/ week in active weight loss)   Weight Loss Consent to treat reviewed and signed.    Total time spent on chart review, pre-charting, obtaining history, counseling, and educating, reviewing labs was 24 minutes.       NOTE TO PATIENT: The 21st Century Cures Act makes clinical notes like these available to patients in the interest of transparency. Clinical notes are medical documents used by physicians and care providers to communicate with each other. These documents include medical language and terminology, abbreviations, and treatment information that may sound technical and at times possibly unfamiliar. In addition, at times, the verbiage may  appear blunt or direct. These documents are one tool providers use to communicate relevant information and clinical opinions of the care providers in a way that allows common understanding of the clinical context.     There are no Patient Instructions on file for this visit.    No follow-ups on file.    Patient verbalizes understanding.    Diana Powers, APRN

## 2024-10-08 ENCOUNTER — PATIENT MESSAGE (OUTPATIENT)
Dept: INTERNAL MEDICINE CLINIC | Facility: CLINIC | Age: 44
End: 2024-10-08

## 2024-10-08 DIAGNOSIS — E66.812 OBESITY, CLASS II, BMI 35-39.9: Primary | ICD-10-CM

## 2024-10-08 RX ORDER — TIRZEPATIDE 10 MG/.5ML
10 INJECTION, SOLUTION SUBCUTANEOUS WEEKLY
Qty: 2 ML | Refills: 2 | Status: SHIPPED | OUTPATIENT
Start: 2024-10-08 | End: 2024-10-30

## 2024-10-08 NOTE — TELEPHONE ENCOUNTER
Requesting zepbound increase  LOV: 9/16/24  RTC: 5 months  Last Relevant Labs: na  Filled: 9/16/24 #2ml with 2 refills  7.5 mg    1/17/2025 10:20 AM Diana Powers APRN [facility]

## 2024-10-08 NOTE — TELEPHONE ENCOUNTER
From: Khanh Honeycutt  To: Kristen Fetter  Sent: 10/8/2024 1:51 PM CDT  Subject: Zepbound step up    Hello, today is my 4th shot of the 7.5mg Zepbound. I adjusted to it well and would like to continue to the 10mg with my next reorder as discussed. Could you please review and send to the [facility] on [address] in [place]? Thank you!

## 2024-11-04 ENCOUNTER — LAB ENCOUNTER (OUTPATIENT)
Dept: LAB | Age: 44
End: 2024-11-04
Attending: NURSE PRACTITIONER
Payer: COMMERCIAL

## 2024-11-04 DIAGNOSIS — R74.01 ELEVATED ALT MEASUREMENT: ICD-10-CM

## 2024-11-04 DIAGNOSIS — K74.00 LIVER FIBROSIS: ICD-10-CM

## 2024-11-04 DIAGNOSIS — K76.0 FATTY LIVER: ICD-10-CM

## 2024-11-04 LAB
ALBUMIN SERPL-MCNC: 5.1 G/DL (ref 3.2–4.8)
ALBUMIN/GLOB SERPL: 2.1 {RATIO} (ref 1–2)
ALP LIVER SERPL-CCNC: 80 U/L
ALT SERPL-CCNC: 61 U/L
ANION GAP SERPL CALC-SCNC: 7 MMOL/L (ref 0–18)
AST SERPL-CCNC: 32 U/L (ref ?–34)
BASOPHILS # BLD AUTO: 0.05 X10(3) UL (ref 0–0.2)
BASOPHILS NFR BLD AUTO: 0.7 %
BILIRUB SERPL-MCNC: 0.6 MG/DL (ref 0.3–1.2)
BUN BLD-MCNC: 13 MG/DL (ref 9–23)
CALCIUM BLD-MCNC: 9.9 MG/DL (ref 8.7–10.4)
CHLORIDE SERPL-SCNC: 105 MMOL/L (ref 98–112)
CO2 SERPL-SCNC: 28 MMOL/L (ref 21–32)
CREAT BLD-MCNC: 1.15 MG/DL
EGFRCR SERPLBLD CKD-EPI 2021: 81 ML/MIN/1.73M2 (ref 60–?)
EOSINOPHIL # BLD AUTO: 0.15 X10(3) UL (ref 0–0.7)
EOSINOPHIL NFR BLD AUTO: 2.1 %
ERYTHROCYTE [DISTWIDTH] IN BLOOD BY AUTOMATED COUNT: 13.9 %
FASTING STATUS PATIENT QL REPORTED: YES
GLOBULIN PLAS-MCNC: 2.4 G/DL (ref 2–3.5)
GLUCOSE BLD-MCNC: 93 MG/DL (ref 70–99)
HCT VFR BLD AUTO: 48.8 %
HGB BLD-MCNC: 16.7 G/DL
IMM GRANULOCYTES # BLD AUTO: 0.01 X10(3) UL (ref 0–1)
IMM GRANULOCYTES NFR BLD: 0.1 %
LYMPHOCYTES # BLD AUTO: 2.87 X10(3) UL (ref 1–4)
LYMPHOCYTES NFR BLD AUTO: 39.3 %
MCH RBC QN AUTO: 31.1 PG (ref 26–34)
MCHC RBC AUTO-ENTMCNC: 34.2 G/DL (ref 31–37)
MCV RBC AUTO: 90.9 FL
MONOCYTES # BLD AUTO: 0.47 X10(3) UL (ref 0.1–1)
MONOCYTES NFR BLD AUTO: 6.4 %
NEUTROPHILS # BLD AUTO: 3.75 X10 (3) UL (ref 1.5–7.7)
NEUTROPHILS # BLD AUTO: 3.75 X10(3) UL (ref 1.5–7.7)
NEUTROPHILS NFR BLD AUTO: 51.4 %
OSMOLALITY SERPL CALC.SUM OF ELEC: 290 MOSM/KG (ref 275–295)
PLATELET # BLD AUTO: 272 10(3)UL (ref 150–450)
POTASSIUM SERPL-SCNC: 3.9 MMOL/L (ref 3.5–5.1)
PROT SERPL-MCNC: 7.5 G/DL (ref 5.7–8.2)
RBC # BLD AUTO: 5.37 X10(6)UL
SODIUM SERPL-SCNC: 140 MMOL/L (ref 136–145)
WBC # BLD AUTO: 7.3 X10(3) UL (ref 4–11)

## 2024-11-04 PROCEDURE — 80053 COMPREHEN METABOLIC PANEL: CPT

## 2024-11-04 PROCEDURE — 36415 COLL VENOUS BLD VENIPUNCTURE: CPT

## 2024-11-04 PROCEDURE — 85025 COMPLETE CBC W/AUTO DIFF WBC: CPT

## 2024-11-06 ENCOUNTER — TELEPHONE (OUTPATIENT)
Dept: INTERNAL MEDICINE CLINIC | Facility: CLINIC | Age: 44
End: 2024-11-06

## 2024-12-26 DIAGNOSIS — I10 ESSENTIAL HYPERTENSION: ICD-10-CM

## 2024-12-26 DIAGNOSIS — E78.00 HIGH CHOLESTEROL: ICD-10-CM

## 2024-12-26 DIAGNOSIS — M10.9 GOUT OF BOTH KNEES: ICD-10-CM

## 2024-12-29 DIAGNOSIS — E78.00 HIGH CHOLESTEROL: ICD-10-CM

## 2024-12-29 DIAGNOSIS — M10.9 GOUT OF BOTH KNEES: ICD-10-CM

## 2024-12-29 DIAGNOSIS — I10 ESSENTIAL HYPERTENSION: ICD-10-CM

## 2024-12-31 RX ORDER — ALLOPURINOL 300 MG/1
300 TABLET ORAL DAILY
Qty: 90 TABLET | Refills: 1 | OUTPATIENT
Start: 2024-12-31

## 2024-12-31 RX ORDER — LISINOPRIL AND HYDROCHLOROTHIAZIDE 12.5; 2 MG/1; MG/1
1 TABLET ORAL DAILY
Qty: 90 TABLET | Refills: 1 | OUTPATIENT
Start: 2024-12-31

## 2024-12-31 RX ORDER — ALLOPURINOL 300 MG/1
300 TABLET ORAL DAILY
Qty: 90 TABLET | Refills: 3 | Status: SHIPPED | OUTPATIENT
Start: 2024-12-31

## 2024-12-31 RX ORDER — ATORVASTATIN CALCIUM 20 MG/1
20 TABLET, FILM COATED ORAL DAILY
Qty: 90 TABLET | Refills: 1 | Status: SHIPPED | OUTPATIENT
Start: 2024-12-31

## 2024-12-31 RX ORDER — LISINOPRIL AND HYDROCHLOROTHIAZIDE 12.5; 2 MG/1; MG/1
1 TABLET ORAL DAILY
Qty: 90 TABLET | Refills: 1 | Status: SHIPPED | OUTPATIENT
Start: 2024-12-31

## 2024-12-31 RX ORDER — ATORVASTATIN CALCIUM 20 MG/1
20 TABLET, FILM COATED ORAL DAILY
Qty: 90 TABLET | Refills: 1 | OUTPATIENT
Start: 2024-12-31

## 2024-12-31 NOTE — TELEPHONE ENCOUNTER
Requesting Allopurinol 300mg  Filled: 6/3/24 #90 with 1 refills    Requesting Lisinopril-Hydrochlorothiazide 20/12.5mg  Filled: 6/3/24 #90 with 1 refills  Hypertension Medications Protocol [id number]/26/2024 06:36 AM   Protocol Details   CMP or BMP in past 12 months    Last BP reading less than 140/90    In person appointment or virtual visit in the past 12 mos or appointment in next 3 mos    EGFRCR or GFRNAA > 50     Requesting Atorvastatin 20mg  Filled: 6/3/24 #90 with 1 refills  Cholesterol Medication Protocol [id number]/26/2024 06:36 AM   Protocol Details   ALT < 80    ALT resulted within past year    Lipid panel within past 12 months    In person appointment or virtual visit in the past 12 mos or appointment in next 3 mos       LOV: 2/12/24 Physical  RTC: 1 year  Last Relevant Labs: 2/22/24      Future Appointments   Date Time Provider Department Center   1/17/2025 10:20 AM Diana Powers APRN [facility]     Rx for Lisinopril-Hydrochlorothiazide and Atorvastatin sent per protocol    Non-protocol med:  Allopurinol Rx pended and routed for approval/denial

## 2025-01-17 ENCOUNTER — TELEPHONE (OUTPATIENT)
Dept: INTERNAL MEDICINE CLINIC | Facility: CLINIC | Age: 45
End: 2025-01-17

## 2025-01-17 ENCOUNTER — TELEMEDICINE (OUTPATIENT)
Dept: INTERNAL MEDICINE CLINIC | Facility: CLINIC | Age: 45
End: 2025-01-17
Payer: COMMERCIAL

## 2025-01-17 DIAGNOSIS — R74.8 ELEVATED LIVER ENZYMES: ICD-10-CM

## 2025-01-17 DIAGNOSIS — I10 ESSENTIAL HYPERTENSION: ICD-10-CM

## 2025-01-17 DIAGNOSIS — E55.9 VITAMIN D DEFICIENCY: ICD-10-CM

## 2025-01-17 DIAGNOSIS — E78.5 DYSLIPIDEMIA: ICD-10-CM

## 2025-01-17 DIAGNOSIS — G47.33 OSA ON CPAP: ICD-10-CM

## 2025-01-17 DIAGNOSIS — E66.812 OBESITY, CLASS II, BMI 35-39.9: ICD-10-CM

## 2025-01-17 DIAGNOSIS — R63.2 BINGE EATING: ICD-10-CM

## 2025-01-17 DIAGNOSIS — Z51.81 THERAPEUTIC DRUG MONITORING: Primary | ICD-10-CM

## 2025-01-17 DIAGNOSIS — R73.03 PREDIABETES: ICD-10-CM

## 2025-01-17 PROCEDURE — 98005 SYNCH AUDIO-VIDEO EST LOW 20: CPT | Performed by: NURSE PRACTITIONER

## 2025-01-17 RX ORDER — TIRZEPATIDE 10 MG/.5ML
10 INJECTION, SOLUTION SUBCUTANEOUS WEEKLY
Qty: 6 ML | Refills: 0 | Status: SHIPPED | OUTPATIENT
Start: 2025-01-17

## 2025-01-17 NOTE — PROGRESS NOTES
[facility] WEIGHT MANAGEMENT VIRTUAL ENCOUNTER     Khanh Honeycutt verbally consents to a Virtual/Telephone Check-In service on 01/17/25   Patient understands and accepts financial responsibility for any deductible, co-insurance and/or co-pays associated with this service.    HISTORY OF PRESENT ILLNESS  Chief Complaint   Patient presents with    Other     F/u on weight management      Khanh Honeycutt is a 44 year old male is being evaluated as a video visit using Telemedicine with live, interactive video and audio    Weight gain/loss since LOV based on home monitoring:   Home scale: 240 lbs  Has lost  #27 lbs since LOV 4 months ago     Compliance with medication: zepbound 10mg weekly   Tolerating well, helping with decreasing appetite and no side effects     Holidays were ok   Has been trying to find a job   Success: Limited snacking throughout the holidays   Challenging:     Lost about 6 lbs during a 3-day bout with norovirus, but rebounded all back plus a few. I'm sure it was mostly water weight/dehydration but it didn't feel great seeing the scale jump back so fast     Exercise/Activity: 1x/ week, via walking, not doing anything routine as far as exercise  Nutrition: eating regular meals, +protein, minimal veggies. not tracking reports  Stress is manageable   Sleep: 6 hours/night, waking up feeling rested    Denies chest pain, shortness of breath, dizziness, blurred vision, headache, paresthesia, nausea/vomiting.     Wt Readings from Last 6 Encounters:   11/04/24 249 lb (112.9 kg)   09/19/24 263 lb 11.2 oz (119.6 kg)   09/16/24 267 lb (121.1 kg)   04/23/24 268 lb (121.6 kg)   02/21/24 259 lb (117.5 kg)   01/08/24 256 lb (116.1 kg)          Subjective  REVIEW OF SYSTEMS  GENERAL HEALTH: feels well otherwise, denied any fevers chills or night sweats   RESPIRATORY: denies shortness of breath   CARDIOVASCULAR: denies chest pain  GI: denies abdominal pain  PSYCH: denies any mood  changes    Objective  EXAM  Reviewed most recent set of vitals   Physical Exam:  GENERAL: well developed, well nourished, in no apparent distress, speaking in full sentences comfortably   SKIN: warm, pink, dry without rashes to exposed area   EYES: conjunctiva pink  HEENT: atraumatic, normocephalic  LUNGS: normal work of breathing, non labored  CARDIO: normal work, no exertion  EXTREMITIES: no cyanosis, no clubbing, no edema  NEURO: Oriented times three  PSYCH: pleasant, cooperative, normal mood and affect    Lab Results   Component Value Date    WBC 7.3 11/04/2024    RBC 5.37 11/04/2024    HGB 16.7 11/04/2024    HCT 48.8 11/04/2024    MCV 90.9 11/04/2024    MCH 31.1 11/04/2024    MCHC 34.2 11/04/2024    RDW 13.9 11/04/2024    .0 11/04/2024     Lab Results   Component Value Date    GLU 93 11/04/2024    BUN 13 11/04/2024    BUNCREA 12.4 12/21/2020    CREATSERUM 1.15 11/04/2024    ANIONGAP 7 11/04/2024    GFRNAA 83 08/01/2022    GFRAA 96 08/01/2022    CA 9.9 11/04/2024    OSMOCALC 290 11/04/2024    ALKPHO 80 11/04/2024    AST 32 11/04/2024    ALT 61 (H) 11/04/2024    BILT 0.6 11/04/2024    TP 7.5 11/04/2024    ALB 5.1 (H) 11/04/2024    GLOBULIN 2.4 11/04/2024     11/04/2024    K 3.9 11/04/2024     11/04/2024    CO2 28.0 11/04/2024     Lab Results   Component Value Date     02/22/2024    A1C 5.9 (H) 02/22/2024     Lab Results   Component Value Date    CHOLEST 177 02/22/2024    TRIG 219 (H) 02/22/2024    HDL 44 02/22/2024    LDL 96 02/22/2024    VLDL 36 (H) 02/22/2024    NONHDLC 133 (H) 02/22/2024     Lab Results   Component Value Date    T4F 0.9 02/22/2024    TSH 1.560 02/22/2024     Lab Results   Component Value Date    B12 402 10/24/2022     Lab Results   Component Value Date    VITD 41.3 02/22/2024       Medications Ordered Prior to Encounter[1]    ASSESSMENT  Analyzed weight data:       Diagnoses and all orders for this visit:    Therapeutic drug monitoring    Obesity, Class II, BMI  35-39.9    Essential hypertension    Binge eating    Vitamin D deficiency    STEWART on CPAP    Dyslipidemia    Prediabetes    Elevated liver enzymes        PLAN  Continue with medications: zepbound 10mg weekly   --advised of side effects and adverse effects of this medication  Contradictions: vyvanse, phentermine, ozempic (not covered by insurance anymore)   Reviewed labs  Binge eating- stable  Will continue with vitamin d OTC  HTN stable, will continue to monitor, managed by PCP  HLD (HDL low), lifestyle education done, follows with PCP  Fatty liver, lifestyle education done  STEWART- continue with CPAP machine nightly  Prediabetes, last a1c 5.9% on 2/2024  C/o of knee pain, encouraged aqua therapy- discussed getting gym membership or fitness center  Advised to monitor blood pressure and pulse at home/ given parameters to review and contact provider.  Nutrition: low carb diet/ recommended to eat breakfast daily/ regular protein intake  Follow up with dietitian and psychologist as recommended.  Discussed the role of sleep and stress in weight management.  Counseled on comprehensive weight loss plan including attention to nutrition, exercise and behavior/stress management for success. See patient instruction below for more details.  Discussed strategies to overcome barriers to successful weight loss and weight maintenance  FITTE: ACSM recommendations (150-300 minutes/ week in active weight loss)       There are no Patient Instructions on file for this visit.    No follow-ups on file.    Patient verbalizes understanding.    Total time spent on chart review, pre-charting, obtaining history, counseling, and educating, reviewing labs was 21 minutes.       Pt understands phone/video evaluation is not a substitute for face to face examination or emergency care. Pt advised to go to the ER or call 911 for worsening symptoms or acute distress.       Please note that the following visit was completed using two-way, real-time  interactive audio and/or video communication.  This has been done in good jonathan to provide continuity of care in the best interest of the provider-patient relationship, due to the ongoing public health crisis/national emergency and because of restrictions of visitation.  There are limitations of this visit as no physical exam could be performed.  Every conscious effort was taken to allow for sufficient and adequate time.  This billing was spent on reviewing labs, medications, radiology tests and decision making.  Appropriate medical decision-making and tests are ordered as detailed in the plan of care above.     NOTE TO PATIENT: The 21st Century Cures Act makes clinical notes like these available to patients in the interest of transparency. Clinical notes are medical documents used by physicians and care providers to communicate with each other. These documents include medical language and terminology, abbreviations, and treatment information that may sound technical and at times possibly unfamiliar. In addition, at times, the verbiage may appear blunt or direct. These documents are one tool providers use to communicate relevant information and clinical opinions of the care providers in a way that allows common understanding of the clinical context.     Diana Powers, APRN  1/17/2025         [1]   Current Outpatient Medications on File Prior to Visit   Medication Sig Dispense Refill    ALLOPURINOL 300 MG Oral Tab TAKE 1 TABLET DAILY 90 tablet 3    lisinopril-hydroCHLOROthiazide 20-12.5 MG Oral Tab TAKE 1 TABLET DAILY 90 tablet 1    atorvastatin 20 MG Oral Tab TAKE 1 TABLET DAILY 90 tablet 1    ZEPBOUND 10 MG/0.5ML Subcutaneous Solution Auto-injector       latanoprost 0.005 % Ophthalmic Solution Place 1 drop into both eyes every evening.       No current facility-administered medications on file prior to visit.

## 2025-01-17 NOTE — PATIENT INSTRUCTIONS
Next steps:  1.  Fill your prescribed medication and take as discussed and prescribed: zepbound 10mg weekly   2.  Schedule a personal nutrition consultation with one of our registered dieticians     Please try to work on the following dietary changes:    1.  Drink water with meals and throughout the day, cut down on soda and/or juice if consumed. Consider flavored water options like Bubbly, Spindrift, Hint and Brenda.  2.  Eat breakfast daily and focus on having protein with each meal, examples include: greek yogurt, cottage cheese, hard boiled egg, whole grain toast with peanut butter.   3.  Reduce refined carbohydrates and sugars which includes items such as sweets, as well as rice, pasta, and bread and make sure to choose whole grain options when having them with just 1 serving per meal about the size of your inner palm.  4.  Consume non starchy veggies daily working towards making them a good 50% of your daily food intake. Add them to lunch and dinner consistently.  5.  Start a daily probiotic: VSL#3 is recommended, (order on line at www.vsl3.com). Take 1 capsule daily with water for 30 days, then reduce to 1 every other day (this will reduce the cost). Capsules can be left out for 2 weeks, but then must be refrigerated.      Please download luna My Fitness Pal, LoseIt! Or Net Diary to monitor daily dietary intake and you will be able to see if you are eating the right amount of calories or too much or too little which would hinder weight loss. Additionally this will help to see your daily carbohydrate and protein intake. When you set the luna up choose 1-2 lbs/week as a goal.  Keeping a paper food journal is an option as well to remain accountable for your choices- this is the start to mindful eating! A low calorie diet has been consistently shown to support weight loss.     Continue or start exercising to help establish a routine. If not already exercising begin with 1 day and progress as able with long-term  goal of 30 minutes 5 days a week at a minimum.     Meditation daily can help manage and control stress. Chronic stress can make weight loss difficult.  Exercising is one way to help with stress, but meditation using the CALM Héctor or another comparable alternative can be done in your home or place of work with little time commitment. This Héctor can also help work on behavior change and improve sleep. Check out the segment under Calm Masterclass and listen to The 4 Pillars of Health. A great way to begin learning about the foundation of lifestyle with practical tips to use in your every day.     Check out www.yourweightmatters.org blog for continued daily support and education along this weight loss journey!    Patient Resources:     Personal Training/Fitness Classes/Health Coaching     [facility] @ [email or web address] Full fitness center with group fitness and personal training. Discount available as client of [facility].  Health Coaching and Personal Training with Dacia Wallis at our [facility]- individual weekly coaching with option to add personal training and small group fitness classes targeted at weight loss- [phone] and/or email @ [email or web address]  360FIT [place] [email or web address] Fitness [phone] and/or email [email or web address]  [facility] Fitness Centers with multiple locations: Stick and Play (www.Pure Nootropics), Eat The [employer] Fitness (www.Umami.[employer]), Fit Body Bootcamp (www.CubeSensorsbodybootOphis Vapep.Silicon & Software Systems), Mira Dx Fitness (www.Eiger BioPharmaceuticals), The Exercise  (www.exercisecoach.[employer])     Online Fitness  Fitness  on Utube  Fit in 10 DVD series- www.cpaaw69AVQ.com  Sit and Be Fit - Chair exercise series Www.sitandbefit.org  Hip Hop Fit with Ashwin Lawrence at www.hiphopfit.net     Apps for on the Go Fitness  Pearcy 7 Minute Workout  (orange box with white 7) - free on the go HIIT training héctor  Peloton Héctor @ www.onepeloton.com     Nutrition Trackers and Tools  LoseIT! And My Fitness Pal apps and on line for tracking nutrition  NOOM - virtual health coaching  FitFoundation (healthy meals on the go) in [place] @ www.[email or web address].ConferenceEdge  Bistro MD @ www.bistromd.com and Ohelqy57 (keto and low carb plans recommended) @ www.gnucwg69.com, Metabolic Meals @ www.[place] [employer] Bio-[employer] GroupMetabolicMeals.com - individual prepared meals to go  Gobble, Blue Apron, Home , Every Plate, Sunbasket- on line meal delivery programs for preparation at home  Meal Village in [place] for homemade meals to go @ www.[email or web address].ConferenceEdge  Diet Doctor @ www.dietdoctor.com - low carb swaps  Yummly - meal prep and planning éhctor (www.yummly.com)     Stress Management/Behavior/Mindful Eating  CALM meditation héctor (www.calm.com)  Headspace  Am I Hungry? Mindful eating virtual  héctor  Www.yourweightmatters.org - Obesity Action Coalition sponsored Blog posts daily  Motivation héctor (black box with white \")- daily supportive messages sent to your phone     Books/Video Education/Podcasts  Mindless Eating by Misael Hylton  Why We Get Sick by Davi Russell (a book about insulin resistance)  Atomic Habits by Salvador Neumann (a book about taking small steps to promote greater behavior change)   Can't Hurt Me by Rick Seals (a book exploring the power of discipline in achieving your goals)  The End of Dieting: How to Live for Life by Dr. Jerson Keenan M.D. or listen to The SmartMenuCard Podcast Episode 63: Understanding \"Nutritarian\" Eating w/Dr. Jerson Keenan  Your Body in Balance: The New Science of Food, Hormones, and Health by Dr. Scottie Schafer  The Menopause Diet Plan by Court Smiley and Lenore Wagner  The Complete Guide to fasting by Dr. Edge  Sugar, Salt & Fat by Leann Galeana, Ph.D, R.D.  Weight Loss Surgery Will Not Treat Food Addiction by Jocelynn Beltran Ph.D  The Game Changers- NetVibeSec  Documentary on plant based nutrition  Fed Up - documentary about obesity (Free on Utube)  The Truth About Sugar - documentary on sugar (Free on Utube, https://youtu.be/0B1pwyqGI5i)  The Dr. Florian T5 Wellness Plan by Dr. Casper Florian MD  Fitlosophy Fitspiration - journal to better health (found at [employer] in fitness aisle)  What Happened to You?- a look at the impact trauma has on behavior written by Devan Willett and Dr. Kaiden Barajas  Whole Again by Ray Worthy - discovering your true self after trauma  Keisha Ernst talk on [employer], The Call to Courage  Podcasts: The Exam Room by the Physician's Committee, Nutrition Facts by Dr. Gonzalez    We are here to support you with weight loss, but please remember that you still need your primary care provider for your routine health maintenance.

## 2025-04-07 DIAGNOSIS — E66.812 OBESITY, CLASS II, BMI 35-39.9: ICD-10-CM

## 2025-04-07 DIAGNOSIS — I10 ESSENTIAL HYPERTENSION: ICD-10-CM

## 2025-04-07 DIAGNOSIS — Z51.81 THERAPEUTIC DRUG MONITORING: ICD-10-CM

## 2025-04-07 RX ORDER — TIRZEPATIDE 10 MG/.5ML
INJECTION, SOLUTION SUBCUTANEOUS
Qty: 6 ML | Refills: 0 | Status: SHIPPED | OUTPATIENT
Start: 2025-04-07

## 2025-04-07 NOTE — TELEPHONE ENCOUNTER
Requesting   Requested Prescriptions     Pending Prescriptions Disp Refills    ZEPBOUND 10 MG/0.5ML Subcutaneous Solution Auto-injector [Pharmacy Med Name: Zepbound 10 Mg/0.5ml Inj Lill] 6 mL 0     Sig: Inject 10mg into the skin as directed once a week      LOV: 1/17/2025 tele  RTC: 6/11/2025  Filled: 1/17/2025 #6 ml with 0 refills    Future Appointments   Date Time Provider Department Center   6/11/2025  9:00 AM Diana Powers APRN [facility]

## 2025-05-21 ENCOUNTER — OFFICE VISIT (OUTPATIENT)
Dept: FAMILY MEDICINE CLINIC | Facility: CLINIC | Age: 45
End: 2025-05-21
Payer: COMMERCIAL

## 2025-05-21 VITALS
DIASTOLIC BLOOD PRESSURE: 62 MMHG | HEIGHT: 67.5 IN | WEIGHT: 261 LBS | SYSTOLIC BLOOD PRESSURE: 122 MMHG | BODY MASS INDEX: 40.49 KG/M2 | OXYGEN SATURATION: 98 % | HEART RATE: 74 BPM | TEMPERATURE: 97 F | RESPIRATION RATE: 14 BRPM

## 2025-05-21 DIAGNOSIS — M10.9 GOUT OF BOTH KNEES: ICD-10-CM

## 2025-05-21 DIAGNOSIS — Z00.00 ROUTINE GENERAL MEDICAL EXAMINATION AT A HEALTH CARE FACILITY: Primary | ICD-10-CM

## 2025-05-21 DIAGNOSIS — I10 ESSENTIAL HYPERTENSION: ICD-10-CM

## 2025-05-21 DIAGNOSIS — E78.00 HIGH CHOLESTEROL: ICD-10-CM

## 2025-05-21 PROCEDURE — 99396 PREV VISIT EST AGE 40-64: CPT | Performed by: FAMILY MEDICINE

## 2025-05-21 PROCEDURE — 3008F BODY MASS INDEX DOCD: CPT | Performed by: FAMILY MEDICINE

## 2025-05-21 PROCEDURE — 3078F DIAST BP <80 MM HG: CPT | Performed by: FAMILY MEDICINE

## 2025-05-21 PROCEDURE — 3074F SYST BP LT 130 MM HG: CPT | Performed by: FAMILY MEDICINE

## 2025-05-21 RX ORDER — LISINOPRIL AND HYDROCHLOROTHIAZIDE 12.5; 2 MG/1; MG/1
1 TABLET ORAL DAILY
Qty: 90 TABLET | Refills: 1 | Status: CANCELLED | OUTPATIENT
Start: 2025-05-21

## 2025-05-22 RX ORDER — ALLOPURINOL 300 MG/1
300 TABLET ORAL DAILY
Qty: 90 TABLET | Refills: 3 | Status: SHIPPED | OUTPATIENT
Start: 2025-05-22

## 2025-05-22 RX ORDER — LISINOPRIL 20 MG/1
20 TABLET ORAL DAILY
Qty: 90 TABLET | Refills: 1 | Status: SHIPPED | OUTPATIENT
Start: 2025-05-22

## 2025-05-22 RX ORDER — ATORVASTATIN CALCIUM 20 MG/1
20 TABLET, FILM COATED ORAL DAILY
Qty: 90 TABLET | Refills: 1 | Status: SHIPPED | OUTPATIENT
Start: 2025-05-22

## 2025-05-22 NOTE — PROGRESS NOTES
The following individual(s) verbally consented to be recorded using ambient AI listening technology and understand that they can each withdraw their consent to this listening technology at any point by asking the clinician to turn off or pause the recording:    Patient name: Khanh Honeycutt  Additional names:  no  Subjective:   Khanh Honeycutt is a 44 year old male who presents for Physical (Labs ordered)       History/Other:   History of Present Illness  Khanh Honeycutt is a 44 year old male who presents for a routine checkup and blood work.    He completely stopped drinking alcohol around August or September of the previous year after concerns about his liver health. Initially evaluated for liver fibrosis and thought to be in stage four fibrosis, a second opinion from another gastroenterologist revealed no cirrhosis and a low fib-4 score. He underwent an ultrasound and a fibroscan as part of his evaluation.    He is currently taking lisinopril-hydrochlorothiazide, allopurinol, atorvastatin, and Zepbound. His blood pressure is well-controlled, and he has not experienced significant side effects from his medications. Occasionally, he experiences a dull ache on his right side, which he attributes to non-medication related causes such as physical activity.    He has been taking a vitamin D supplement daily. On Zepbound 10 mg for weight management, he has lost weight from 285 pounds to a stable weight around 250 pounds. He uses [facility] for medication refills and manages his prescriptions without issues.    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 He has a son who is graduating high school and will be attending [employer].    No muscle pain or aching, jaundice, or right upper quadrant pain. Reports occasional dull ache on the right  side, possibly related to physical activity.   Chief Complaint Reviewed and Verified  Nursing Notes Reviewed and   Verified  Tobacco Reviewed  Allergies Reviewed  Medications Reviewed    Medical History Reviewed  Surgical History Reviewed  Family History   Reviewed  Social History Reviewed         Tobacco:  He has never smoked tobacco.    Current Medications[1]    PHQ-2 SCORE: 0  , done 5/21/2025   Last [place] Suicide Screening on 5/21/2025 was No Risk.       Review of Systems:  Pertinent items are noted in HPI.  A comprehensive review of systems was negative.      Objective:   /62   Pulse 74   Temp 97.2 °F (36.2 °C) (Temporal)   Resp 14   Ht 67.5\"   Wt 261 lb (118.4 kg)   SpO2 98%   BMI 40.28 kg/m²  Estimated body mass index is 40.28 kg/m² as calculated from the following:    Height as of this encounter: 67.5\".    Weight as of this encounter: 261 lb (118.4 kg).  Results  LABS  Fib-4 score: Low, not indicative of cirrhosis    RADIOLOGY  Ultrasound: Completed    DIAGNOSTIC  Fibroscan: Completed     Physical Exam  VITALS: BP- 122/62  MEASUREMENTS: Weight- 285.  Eyes:  PERRLA, EOMI, conjunctiva non-injected, sclera non-injected and non-icteric.  HENT:  normocephalic, atraumatic, external ear canals clear bilaterally, tympanic membranes appear opaque, non-bulging, non-erythematous, nasal turbinates are non-inflamed and not swollen, oropharynx without erythema, swelling, or exudate, gingiva and lips without any apparent lesions.   Cardiac:  S1 S2 regular rate and rhythm, no murmur, gallop, or rub  Respiratory:  Bilaterally clear to auscultation, no chest tenderness to palpation, no wheezing, no rhonchi, no rales, air entry is adequate, no accessory respiratory muscle use, no chest asymmetry, normal excursion.  GI:  bowel sound positive in all four quadrants, abdomen is soft, non-tender, non-distended, no hepatosplenomegaly, no abnormal aortic pulsation.  MS:  No paraspinal or spinal tenderness,  negative straight leg raise bilaterally, no joint swelling or tenderness, normal strength, range of motion and sensation in all four extremities.  Neurologic:  alert and oriented to time, space, and person, cranial nerves two through twelve grossly intact, two plus deep tendon reflexes in all four extremities, gait is normal, negative Romberg sign, coordination with finger to nose and heel to shin intact. No focal neurologic deficit noted.  Psychiatric:  mood and affect are normal, no apparent thought disorder, judgement and insight appear intact.      Assessment & Plan:   1. Routine general medical examination at a health care facility (Primary)  -     Lipid Panel; Future; Expected date: 05/22/2025  -     Comp Metabolic Panel (14); Future; Expected date: 05/22/2025  -     CBC With Differential With Platelet; Future; Expected date: 05/22/2025  2. Gout of both knees  -     Allopurinol; Take 1 tablet (300 mg total) by mouth daily.  Dispense: 90 tablet; Refill: 3  3. High cholesterol  -     Atorvastatin Calcium; Take 1 tablet (20 mg total) by mouth daily.  Dispense: 90 tablet; Refill: 1  4. Essential hypertension  Other orders  -     Lisinopril; Take 1 tablet (20 mg total) by mouth daily.  Dispense: 90 tablet; Refill: 1    Assessment & Plan  Obesity  Obesity is managed with Zepbound 10 mg, resulting in significant weight loss from 285 lbs to 250 lbs, though some weight regain has occurred. Zepbound is effective, and lifestyle modifications are encouraged to aid further weight reduction.  - Continue Zepbound 10 mg  - Encourage lifestyle modifications for weight reduction    Hypertension  Hypertension is well-controlled with a blood pressure of 122/62 mmHg on lisinopril-hydrochlorothiazide. Discussed discontinuing hydrochlorothiazide due to good control and switching to lisinopril 20 mg alone. He has adjusted to the current regimen without significant side effects.  - Switch to lisinopril 20 mg without  hydrochlorothiazide  - Monitor blood pressure    Hyperlipidemia  Hyperlipidemia is managed with atorvastatin 20 mg. He reports no significant side effects such as muscle pain or liver issues. Discussed rare risk of liver injury with statins, manifesting as elevated liver enzymes and jaundice. Advised on potential muscle issues due to CoQ10 depletion and to consider CoQ10 supplementation if unexplained muscle aches develop.  - Continue atorvastatin 20 mg  - Consider CoQ10 supplementation if unexplained muscle aches develop    Low vitamin D  Low vitamin D is managed with a daily vitamin D supplement. Plan to check vitamin D levels as part of routine blood work to ensure adequate supplementation.  - Check vitamin D levels in blood work        No follow-ups on file.        West Torres MD, 5/22/2025, 8:36 AM             [1]   Current Outpatient Medications   Medication Sig Dispense Refill    allopurinol 300 MG Oral Tab Take 1 tablet (300 mg total) by mouth daily. 90 tablet 3    atorvastatin 20 MG Oral Tab Take 1 tablet (20 mg total) by mouth daily. 90 tablet 1    lisinopril 20 MG Oral Tab Take 1 tablet (20 mg total) by mouth daily. 90 tablet 1    ZEPBOUND 10 MG/0.5ML Subcutaneous Solution Auto-injector Inject 10mg into the skin as directed once a week 6 mL 0    lisinopril-hydroCHLOROthiazide 20-12.5 MG Oral Tab TAKE 1 TABLET DAILY 90 tablet 1    latanoprost 0.005 % Ophthalmic Solution Place 1 drop into both eyes every evening.

## 2025-06-11 ENCOUNTER — LAB ENCOUNTER (OUTPATIENT)
Dept: LAB | Age: 45
End: 2025-06-11
Attending: FAMILY MEDICINE
Payer: COMMERCIAL

## 2025-06-11 ENCOUNTER — OFFICE VISIT (OUTPATIENT)
Dept: INTERNAL MEDICINE CLINIC | Facility: CLINIC | Age: 45
End: 2025-06-11
Payer: COMMERCIAL

## 2025-06-11 VITALS
HEIGHT: 67.5 IN | HEART RATE: 73 BPM | SYSTOLIC BLOOD PRESSURE: 112 MMHG | BODY MASS INDEX: 40.18 KG/M2 | DIASTOLIC BLOOD PRESSURE: 66 MMHG | WEIGHT: 259 LBS | RESPIRATION RATE: 18 BRPM | OXYGEN SATURATION: 96 %

## 2025-06-11 DIAGNOSIS — E55.9 VITAMIN D DEFICIENCY: ICD-10-CM

## 2025-06-11 DIAGNOSIS — R74.8 ELEVATED LIVER ENZYMES: ICD-10-CM

## 2025-06-11 DIAGNOSIS — R63.2 BINGE EATING: ICD-10-CM

## 2025-06-11 DIAGNOSIS — R73.03 PREDIABETES: ICD-10-CM

## 2025-06-11 DIAGNOSIS — E66.812 OBESITY, CLASS II, BMI 35-39.9: ICD-10-CM

## 2025-06-11 DIAGNOSIS — G47.33 OSA ON CPAP: ICD-10-CM

## 2025-06-11 DIAGNOSIS — Z00.00 ROUTINE GENERAL MEDICAL EXAMINATION AT A HEALTH CARE FACILITY: ICD-10-CM

## 2025-06-11 DIAGNOSIS — I10 ESSENTIAL HYPERTENSION: ICD-10-CM

## 2025-06-11 DIAGNOSIS — E78.5 DYSLIPIDEMIA: ICD-10-CM

## 2025-06-11 DIAGNOSIS — Z51.81 THERAPEUTIC DRUG MONITORING: ICD-10-CM

## 2025-06-11 DIAGNOSIS — Z51.81 THERAPEUTIC DRUG MONITORING: Primary | ICD-10-CM

## 2025-06-11 LAB
ALBUMIN SERPL-MCNC: 5 G/DL (ref 3.2–4.8)
ALBUMIN/GLOB SERPL: 2.2 {RATIO} (ref 1–2)
ALP LIVER SERPL-CCNC: 81 U/L (ref 45–117)
ALT SERPL-CCNC: 61 U/L (ref 10–49)
ANION GAP SERPL CALC-SCNC: 11 MMOL/L (ref 0–18)
AST SERPL-CCNC: 28 U/L (ref ?–34)
BASOPHILS # BLD AUTO: 0.07 X10(3) UL (ref 0–0.2)
BASOPHILS NFR BLD AUTO: 0.9 %
BILIRUB SERPL-MCNC: 0.3 MG/DL (ref 0.3–1.2)
BUN BLD-MCNC: 14 MG/DL (ref 9–23)
CALCIUM BLD-MCNC: 10 MG/DL (ref 8.7–10.6)
CHLORIDE SERPL-SCNC: 107 MMOL/L (ref 98–112)
CHOLEST SERPL-MCNC: 131 MG/DL (ref ?–200)
CO2 SERPL-SCNC: 28 MMOL/L (ref 21–32)
CREAT BLD-MCNC: 1.14 MG/DL (ref 0.7–1.3)
EGFRCR SERPLBLD CKD-EPI 2021: 82 ML/MIN/1.73M2 (ref 60–?)
EOSINOPHIL # BLD AUTO: 0.21 X10(3) UL (ref 0–0.7)
EOSINOPHIL NFR BLD AUTO: 2.8 %
ERYTHROCYTE [DISTWIDTH] IN BLOOD BY AUTOMATED COUNT: 13.9 %
EST. AVERAGE GLUCOSE BLD GHB EST-MCNC: 103 MG/DL (ref 68–126)
FASTING PATIENT LIPID ANSWER: YES
FASTING STATUS PATIENT QL REPORTED: YES
GLOBULIN PLAS-MCNC: 2.3 G/DL (ref 2–3.5)
GLUCOSE BLD-MCNC: 96 MG/DL (ref 70–99)
HBA1C MFR BLD: 5.2 % (ref ?–5.7)
HCT VFR BLD AUTO: 47.3 % (ref 39–53)
HDLC SERPL-MCNC: 38 MG/DL (ref 40–59)
HGB BLD-MCNC: 15.6 G/DL (ref 13–17.5)
IMM GRANULOCYTES # BLD AUTO: 0.01 X10(3) UL (ref 0–1)
IMM GRANULOCYTES NFR BLD: 0.1 %
LDLC SERPL CALC-MCNC: 66 MG/DL (ref ?–100)
LYMPHOCYTES # BLD AUTO: 3.43 X10(3) UL (ref 1–4)
LYMPHOCYTES NFR BLD AUTO: 45.9 %
MCH RBC QN AUTO: 30.5 PG (ref 26–34)
MCHC RBC AUTO-ENTMCNC: 33 G/DL (ref 31–37)
MCV RBC AUTO: 92.4 FL (ref 80–100)
MONOCYTES # BLD AUTO: 0.5 X10(3) UL (ref 0.1–1)
MONOCYTES NFR BLD AUTO: 6.7 %
NEUTROPHILS # BLD AUTO: 3.25 X10 (3) UL (ref 1.5–7.7)
NEUTROPHILS # BLD AUTO: 3.25 X10(3) UL (ref 1.5–7.7)
NEUTROPHILS NFR BLD AUTO: 43.6 %
NONHDLC SERPL-MCNC: 93 MG/DL (ref ?–130)
OSMOLALITY SERPL CALC.SUM OF ELEC: 302 MOSM/KG (ref 275–295)
PLATELET # BLD AUTO: 274 10(3)UL (ref 150–450)
POTASSIUM SERPL-SCNC: 4.2 MMOL/L (ref 3.5–5.1)
PROT SERPL-MCNC: 7.3 G/DL (ref 5.7–8.2)
RBC # BLD AUTO: 5.12 X10(6)UL (ref 4.3–5.7)
SODIUM SERPL-SCNC: 146 MMOL/L (ref 136–145)
TRIGL SERPL-MCNC: 154 MG/DL (ref 30–149)
VIT B12 SERPL-MCNC: 407 PG/ML (ref 211–911)
VIT D+METAB SERPL-MCNC: 41.9 NG/ML (ref 30–100)
VLDLC SERPL CALC-MCNC: 23 MG/DL (ref 0–30)
WBC # BLD AUTO: 7.5 X10(3) UL (ref 4–11)

## 2025-06-11 PROCEDURE — 3078F DIAST BP <80 MM HG: CPT | Performed by: NURSE PRACTITIONER

## 2025-06-11 PROCEDURE — 99214 OFFICE O/P EST MOD 30 MIN: CPT | Performed by: NURSE PRACTITIONER

## 2025-06-11 PROCEDURE — 82306 VITAMIN D 25 HYDROXY: CPT | Performed by: NURSE PRACTITIONER

## 2025-06-11 PROCEDURE — 3008F BODY MASS INDEX DOCD: CPT | Performed by: NURSE PRACTITIONER

## 2025-06-11 PROCEDURE — 82607 VITAMIN B-12: CPT | Performed by: NURSE PRACTITIONER

## 2025-06-11 PROCEDURE — 83036 HEMOGLOBIN GLYCOSYLATED A1C: CPT | Performed by: NURSE PRACTITIONER

## 2025-06-11 PROCEDURE — 85025 COMPLETE CBC W/AUTO DIFF WBC: CPT | Performed by: FAMILY MEDICINE

## 2025-06-11 PROCEDURE — 80061 LIPID PANEL: CPT | Performed by: FAMILY MEDICINE

## 2025-06-11 PROCEDURE — 3074F SYST BP LT 130 MM HG: CPT | Performed by: NURSE PRACTITIONER

## 2025-06-11 PROCEDURE — 80053 COMPREHEN METABOLIC PANEL: CPT | Performed by: FAMILY MEDICINE

## 2025-06-11 RX ORDER — TIRZEPATIDE 12.5 MG/.5ML
12.5 INJECTION, SOLUTION SUBCUTANEOUS WEEKLY
Qty: 2 ML | Refills: 3 | Status: SHIPPED | OUTPATIENT
Start: 2025-07-02 | End: 2025-07-24

## 2025-06-11 RX ORDER — PHENTERMINE HYDROCHLORIDE 37.5 MG/1
37.5 TABLET ORAL
Qty: 30 TABLET | Refills: 4 | Status: SHIPPED | OUTPATIENT
Start: 2025-06-11

## 2025-06-11 NOTE — PATIENT INSTRUCTIONS
Next steps:  1.  Fill your prescribed medication and take as discussed and prescribed: zepbound 12.5mg weekly   2.  Schedule a personal nutrition consultation with one of our registered dieticians     Please try to work on the following dietary changes:  Daily protein recommendation to start:  grams  Daily carbohydrate: <170g  Daily calories: 1,900-2,000  1.  Drink water with meals and throughout the day, cut down on soda and/or juice if consumed. Consider flavored water options like Bubbly, Spindrift, Hint and Brenda.  2.  Eat breakfast daily and focus on having protein with each meal, examples include: greek yogurt, cottage cheese, hard boiled egg, whole grain toast with peanut butter.   3.  Reduce refined carbohydrates and sugars which includes items such as sweets, as well as rice, pasta, and bread and make sure to choose whole grain options when having them with just 1 serving per meal about the size of your inner palm.  4.  Consume non starchy veggies daily working towards making them a good 50% of your daily food intake. Add them to lunch and dinner consistently.  5.  Start a daily probiotic: VSL#3 is recommended, (order on line at www.vsl3.com). Take 1 capsule daily with water for 30 days, then reduce to 1 every other day (this will reduce the cost). Capsules can be left out for 2 weeks, but then must be refrigerated.      Please download luna My Fitness Pal, LoseIt! Or Net Diary to monitor daily dietary intake and you will be able to see if you are eating the right amount of calories or too much or too little which would hinder weight loss. Additionally this will help to see your daily carbohydrate and protein intake. When you set the luna up choose 1-2 lbs/week as a goal.  Keeping a paper food journal is an option as well to remain accountable for your choices- this is the start to mindful eating! A low calorie diet has been consistently shown to support weight loss.     Continue or start exercising  to help establish a routine. If not already exercising begin with 1 day and progress as able with long-term goal of 30 minutes 5 days a week at a minimum.     Meditation daily can help manage and control stress. Chronic stress can make weight loss difficult.  Exercising is one way to help with stress, but meditation using the CALM Héctor or another comparable alternative can be done in your home or place of work with little time commitment. This Héctor can also help work on behavior change and improve sleep. Check out the segment under Calm Masterclass and listen to The 4 Pillars of Health. A great way to begin learning about the foundation of lifestyle with practical tips to use in your every day.     Check out www.yourweightmatters.org blog for continued daily support and education along this weight loss journey!    Patient Resources:     Personal Training/Fitness Classes/Health Coaching     [facility] @ [email or web address] Full fitness center with group fitness and personal training. Discount available as client of [facility].  Health Coaching and Personal Training with Dacia Wallis at our [facility]- individual weekly coaching with option to add personal training and small group fitness classes targeted at weight loss- [phone] and/or email @ [email or web address]  360FIT [place] [email or web address]. Group Fitness [phone] and/or email Anette at [email or web address]  [facility] Fitness Centers with multiple locations: Mantis Deposition (www.Aria Systems), Eat The Frog Fitness (www.Zzzzapp Wireless ltd..Reonomy), Fit Body Bootcamp (www.PanonobodybootAlphaLabp.Reonomy), Nextinit (www.Broadchoice.Reonomy), The Exercise  (www.exercisecoach.Reonomy)     Online Fitness  Fitness  on Precision for Medicine  Fit in 10 DVD series- www.lpknd54LLW.com  Sit and Be Fit - Chair exercise series  Www.sitandbefit.org  Hip Hop Fit with Ashwin Lawrence at www.hiphopfit.net     Apps for on the Go Fitness  AlterPoint 7 Minute Workout (orange box with white 7) - free on the go HIIT training héctor  Peloton Héctor @ www.onepeloton.com     Nutrition Trackers and Tools  LoseIT! And My Fitness Pal apps and on line for tracking nutrition  NOOM - virtual health coaching  FitFoundation (healthy meals on the go) in [place] @ www.tjicdllardswf1zPocket Change Card  Justyn NORWOOD @ [email or web address].com and Ekymap44 (keto and low carb plans recommended) @ www.vnkoys55.com, Metabolic Meals @ www.MyMetabolicMeals.com - individual prepared meals to go  Gobble, Blue Apron, Home , Every Plate, Sunbasket- on line meal delivery programs for preparation at home  Meal Village in [place] for homemade meals to go @ www.mealConvrrtage.Crowdbaron  Diet Doctor @ www.dietdoctor.Crowdbaron - low carb swaps  Yummly - meal prep and planning héctor (www.yummly.com)     Stress Management/Behavior/Mindful Eating  CALM meditation héctor (www.calm.com)  Headspace  Am I Hungry? Mindful eating virtual  héctor  Www.yourweightmatters.org - Obesity Action Coalition sponsored Blog posts daily  Motivation héctor (black box with white \")- daily supportive messages sent to your phone     Books/Video Education/Podcasts  Mindless Eating by Misael Hylton  Why We Get Sick by Davi Russell (a book about insulin resistance)  Atomic Habits by Salvador Neumann (a book about taking small steps to promote greater behavior change)   Can't Hurt Me by Rick Seals (a book exploring the power of discipline in achieving your goals)  The End of Dieting: How to Live for Life by Dr. Jerson Keenan M.D. or listen to The MyLorry Podcast Episode 63: Understanding \"Nutritarian\" Eating w/Dr. Jerson Keenan  Your Body in Balance: The New Science of Food, Hormones, and Health by Dr. Scottie Schafer  The Menopause Diet Plan by Court Smiley and Lenore Wagner  The Complete Guide to fasting by Dr. Edge  Sugar, Salt & Fat by Leann  Kervin, Ph.D, R.D.  Weight Loss Surgery Will Not Treat Food Addiction by Jocelynn Beltran Ph.D  The Game Changers- [employer]six Documentary on plant based nutrition  Fed Up - documentary about obesity (Free on Utube)  The Truth About Sugar - documentary on sugar (Free on Utube, https://youtu.be/4K0mdkmLX6f)  The Dr. Florian T5 Wellness Plan by Dr. Casper Florian MD  Fitlosophy Fitspiration - journal to better health (found at [employer] in fitness aisle)  What Happened to You?- a look at the impact trauma has on behavior written by Devan Willett and Dr. Kaiden Barajas  Whole Again by Ray Worthy - discovering your true self after trauma  Keisha Ernst talk on [place], The Call to Courage  Podcasts: The Exam Room by the Physician's Committee, Nutrition Facts by Dr. Gonzalez    We are here to support you with weight loss, but please remember that you still need your primary care provider for your routine health maintenance.

## 2025-06-11 NOTE — PROGRESS NOTES
HISTORY OF PRESENT ILLNESS  Chief Complaint   Patient presents with    Weight Check     Up 19 lb  1/17/25 tele     Khanh Honeycutt is a 44 year old male here for follow up with medical weight loss program for the treatment of overweight, obesity, or morbid obesity.     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 not tracking reports  Meals out per week on average: 0-1  Stress is manageable   Sleep: 5.5-6.5 hours/night, waking up feeling rested    Denies chest pain, shortness of breath, dizziness, blurred vision, headache, paresthesia, nausea/vomiting.     Breakfast Lunch Dinner Snacks Fluids   Reviewed              Wt Readings from Last 6 Encounters:   06/11/25 259 lb (117.5 kg)   05/21/25 261 lb (118.4 kg)   11/04/24 249 lb (112.9 kg)   09/19/24 263 lb 11.2 oz (119.6 kg)   09/16/24 267 lb (121.1 kg)   04/23/24 268 lb (121.6 kg)          REVIEW OF SYSTEMS  GENERAL: feels well otherwise, denied any fevers chills or night sweats   LUNGS: denies shortness of breath  CARDIOVASCULAR: denies chest pain  GI: denies abdominal pain  MUSCULOSKELETAL: denies back pain, joint pains   PSYCH: denies change in behavior or mood, denies feeling sad or depressed    EXAM  /66   Pulse 73   Resp 18   Ht 5' 7.5\" (1.715 m)   Wt 259 lb (117.5 kg)   SpO2 96%   BMI 39.97 kg/m²       GENERAL: well developed, well nourished, in no apparent distress, A/O x3  SKIN: no rashes, no suspicious lesions  HEENT: atraumatic, normocephalic, OP-clear, PERRLA  NECK: supple, no adenopathy  LUNGS: CTA in all fields, breathing non  labored  CARDIO: RRR without murmur  GI: +BS, NT/ND, no masses or HSM  EXTREMITIES: no cyanosis, no clubbing, no edema    Lab Results   Component Value Date    GLU 93 11/04/2024    BUN 13 11/04/2024    BUNCREA 12.4 12/21/2020    CREATSERUM 1.15 11/04/2024    ANIONGAP 7 11/04/2024    GFRNAA 83 08/01/2022    GFRAA 96 08/01/2022    CA 9.9 11/04/2024    OSMOCALC 290 11/04/2024    ALKPHO 80 11/04/2024    AST 32 11/04/2024    ALT 61 (H) 11/04/2024    BILT 0.6 11/04/2024    TP 7.5 11/04/2024    ALB 5.1 (H) 11/04/2024    GLOBULIN 2.4 11/04/2024     11/04/2024    K 3.9 11/04/2024     11/04/2024    CO2 28.0 11/04/2024     Lab Results   Component Value Date     02/22/2024    A1C 5.9 (H) 02/22/2024     Lab Results   Component Value Date    CHOLEST 177 02/22/2024    TRIG 219 (H) 02/22/2024    HDL 44 02/22/2024    LDL 96 02/22/2024    VLDL 36 (H) 02/22/2024    NONHDLC 133 (H) 02/22/2024     Lab Results   Component Value Date    B12 402 10/24/2022     Lab Results   Component Value Date    VITD 41.3 02/22/2024       Medications Ordered Prior to Encounter[1]    ASSESSMENT/PLAN    ICD-10-CM    1. Therapeutic drug monitoring  Z51.81       2. Obesity, Class II, BMI 35-39.9  E66.812       3. Essential hypertension  I10       4. STEWART on CPAP  G47.33       5. Vitamin D deficiency  E55.9       6. Prediabetes  R73.03       7. Elevated liver enzymes  R74.8       8. Binge eating  R63.2       9. Dyslipidemia  E78.5           PLAN   Initial Weight Data and Goal Weight Loss:  Initial consult: #284 lbs on 10/2022  Weight Calculations  Initial Weight: 284 lbs  Initial Weight Date: 10/01/22  Today's Weight: 259 lbs  5% Goal: 14.2  10% Goal: 28.4  Total Weight Loss: 25 lbs  Total weight loss: up #19 lbs total, Net loss 25 lbs  Will increase medications: zepbound 12.5mg weekly (is paying out of pocket)    --advised of side effects and adverse effects of this medication  Contradictions: vyvanse, phentermine, ozempic (not covered by  insurance anymore)   Reviewed labs  Binge eating- stable  Will continue with vitamin d OTC  HTN stable, will continue to monitor, managed by PCP  HLD (HDL low), lifestyle education done, follows with PCP  Fatty liver, lifestyle education done  STEWART- continue with CPAP machine nightly  Prediabetes, last a1c 5.9% on 2/2024  Wrote out macros and encouraged tracking food  Nutrition: Low carb diet, recommended to eat breakfast daily/ regular protein intake  Follow up with dietitian and psychologist as recommended.  Discussed the role of sleep and stress in weight management.  Counseled on comprehensive weight loss plan including attention to nutrition, exercise and behavior/stress management for success. See patient instruction below for more details.  Discussed strategies to overcome barriers to successful weight loss and weight maintenance  FITTE: ACSM recommendations (150-300 minutes/ week in active weight loss)   Weight Loss Consent to treat reviewed and signed.    Total time spent on chart review, pre-charting, obtaining history, counseling, and educating, reviewing labs was 30 minutes.       NOTE TO PATIENT: The 21st Century Cures Act makes clinical notes like these available to patients in the interest of transparency. Clinical notes are medical documents used by physicians and care providers to communicate with each other. These documents include medical language and terminology, abbreviations, and treatment information that may sound technical and at times possibly unfamiliar. In addition, at times, the verbiage may appear blunt or direct. These documents are one tool providers use to communicate relevant information and clinical opinions of the care providers in a way that allows common understanding of the clinical context.     There are no Patient Instructions on file for this visit.    No follow-ups on file.    Patient verbalizes understanding.    Diana Powers, ROCK             [1]   Current Outpatient  Medications on File Prior to Visit   Medication Sig Dispense Refill    allopurinol 300 MG Oral Tab Take 1 tablet (300 mg total) by mouth daily. 90 tablet 3    atorvastatin 20 MG Oral Tab Take 1 tablet (20 mg total) by mouth daily. 90 tablet 1    lisinopril 20 MG Oral Tab Take 1 tablet (20 mg total) by mouth daily. 90 tablet 1    ZEPBOUND 10 MG/0.5ML Subcutaneous Solution Auto-injector Inject 10mg into the skin as directed once a week 6 mL 0    latanoprost 0.005 % Ophthalmic Solution Place 1 drop into both eyes every evening.      lisinopril-hydroCHLOROthiazide 20-12.5 MG Oral Tab TAKE 1 TABLET DAILY 90 tablet 1     No current facility-administered medications on file prior to visit.

## 2025-06-11 NOTE — PROGRESS NOTES
A1c is 5.2% is normal   Mildly low vitamin b12 - please start daily over the counter b complex vitamin   Vitamin d is normal

## 2025-07-04 DIAGNOSIS — E66.812 OBESITY, CLASS II, BMI 35-39.9: ICD-10-CM

## 2025-07-04 DIAGNOSIS — Z51.81 THERAPEUTIC DRUG MONITORING: ICD-10-CM

## 2025-07-04 DIAGNOSIS — I10 ESSENTIAL HYPERTENSION: ICD-10-CM

## 2025-07-07 RX ORDER — TIRZEPATIDE 10 MG/.5ML
INJECTION, SOLUTION SUBCUTANEOUS
Qty: 6 ML | Refills: 0 | OUTPATIENT
Start: 2025-07-07

## (undated) NOTE — Clinical Note
Down a total of 30 lbs  Sincerely, ROCK Forde APRN, [facility] [place] [address] [facility]  [facility] [address], [address] [facility], [place], [address]  [phone] [place]. [facility]